# Patient Record
Sex: FEMALE | Race: WHITE | Employment: FULL TIME | URBAN - METROPOLITAN AREA
[De-identification: names, ages, dates, MRNs, and addresses within clinical notes are randomized per-mention and may not be internally consistent; named-entity substitution may affect disease eponyms.]

---

## 2017-01-12 ENCOUNTER — TELEPHONE (OUTPATIENT)
Dept: FAMILY MEDICINE CLINIC | Age: 52
End: 2017-01-12

## 2017-01-12 RX ORDER — LOSARTAN POTASSIUM 50 MG/1
50 TABLET ORAL DAILY
Qty: 90 TABLET | Refills: 0 | Status: SHIPPED | OUTPATIENT
Start: 2017-01-12 | End: 2017-03-20 | Stop reason: SDUPTHER

## 2017-03-20 RX ORDER — LOSARTAN POTASSIUM 50 MG/1
50 TABLET ORAL DAILY
Qty: 90 TABLET | Refills: 4 | Status: SHIPPED | OUTPATIENT
Start: 2017-03-20 | End: 2018-04-03 | Stop reason: SDUPTHER

## 2017-04-13 RX ORDER — LOSARTAN POTASSIUM 50 MG/1
50 TABLET ORAL DAILY
Qty: 90 TABLET | Refills: 4 | OUTPATIENT
Start: 2017-04-13

## 2017-11-09 ENCOUNTER — OFFICE VISIT (OUTPATIENT)
Dept: FAMILY MEDICINE CLINIC | Age: 52
End: 2017-11-09

## 2017-11-09 VITALS
RESPIRATION RATE: 16 BRPM | BODY MASS INDEX: 24.99 KG/M2 | OXYGEN SATURATION: 98 % | DIASTOLIC BLOOD PRESSURE: 70 MMHG | SYSTOLIC BLOOD PRESSURE: 132 MMHG | TEMPERATURE: 98 F | HEIGHT: 65 IN | HEART RATE: 78 BPM | WEIGHT: 150 LBS

## 2017-11-09 DIAGNOSIS — R31.9 HEMATURIA, UNSPECIFIED TYPE: ICD-10-CM

## 2017-11-09 DIAGNOSIS — Z12.11 COLON CANCER SCREENING: ICD-10-CM

## 2017-11-09 DIAGNOSIS — F43.20 ADJUSTMENT DISORDER, UNSPECIFIED TYPE: ICD-10-CM

## 2017-11-09 DIAGNOSIS — N20.0 NEPHROLITHIASIS: ICD-10-CM

## 2017-11-09 DIAGNOSIS — L20.84 INTRINSIC ECZEMA: ICD-10-CM

## 2017-11-09 DIAGNOSIS — I10 ESSENTIAL HYPERTENSION: Primary | ICD-10-CM

## 2017-11-09 PROCEDURE — G8427 DOCREV CUR MEDS BY ELIG CLIN: HCPCS | Performed by: FAMILY MEDICINE

## 2017-11-09 PROCEDURE — G8420 CALC BMI NORM PARAMETERS: HCPCS | Performed by: FAMILY MEDICINE

## 2017-11-09 PROCEDURE — G8484 FLU IMMUNIZE NO ADMIN: HCPCS | Performed by: FAMILY MEDICINE

## 2017-11-09 PROCEDURE — 3017F COLORECTAL CA SCREEN DOC REV: CPT | Performed by: FAMILY MEDICINE

## 2017-11-09 PROCEDURE — 99214 OFFICE O/P EST MOD 30 MIN: CPT | Performed by: FAMILY MEDICINE

## 2017-11-09 PROCEDURE — 90688 IIV4 VACCINE SPLT 0.5 ML IM: CPT | Performed by: FAMILY MEDICINE

## 2017-11-09 PROCEDURE — 3014F SCREEN MAMMO DOC REV: CPT | Performed by: FAMILY MEDICINE

## 2017-11-09 PROCEDURE — 90471 IMMUNIZATION ADMIN: CPT | Performed by: FAMILY MEDICINE

## 2017-11-09 PROCEDURE — 1036F TOBACCO NON-USER: CPT | Performed by: FAMILY MEDICINE

## 2017-11-09 ASSESSMENT — PATIENT HEALTH QUESTIONNAIRE - PHQ9
2. FEELING DOWN, DEPRESSED OR HOPELESS: 0
1. LITTLE INTEREST OR PLEASURE IN DOING THINGS: 0
SUM OF ALL RESPONSES TO PHQ9 QUESTIONS 1 & 2: 0
SUM OF ALL RESPONSES TO PHQ QUESTIONS 1-9: 0

## 2017-11-13 NOTE — PROGRESS NOTES
Chief Complaint   Patient presents with    Check-Up    Discuss Labs   bp under good control  No cp/sob    No further symptoms of nephrolithiasis  saw urology  Has renal stones    Obese condition stable  Needs weight loss    lbp under reasonable control  No radiation  Declines more eval    Needs colon cancer screening  Recommend colonoscopy and GI evaluation    Patient requests influenza vaccination    Patient presents for exam.      Patient Active Problem List   Diagnosis    Nephrolithiasis    Hematuria, Larchian    Adjustment disorder    Essential hypertension    Intrinsic eczema       has a current medication list which includes the following prescription(s): losartan, calcipotriene, aspirin, and multiple vitamins-minerals. Past Medical History:   Diagnosis Date    Hematuria, Larchian 8/21/2014    Nephrolithiasis 8/21/2014    UTI (urinary tract infection) 8/21/2014       Past Surgical History:   Procedure Laterality Date    LAPAROSCOPY  2002       [unfilled]    family history includes Cancer in her father; High Blood Pressure in her father and mother. Social History     Social History    Marital status:      Spouse name: N/A    Number of children: N/A    Years of education: N/A     Occupational History    Not on file.      Social History Main Topics    Smoking status: Former Smoker    Smokeless tobacco: Never Used    Alcohol use Not on file    Drug use: Unknown    Sexual activity: Not on file     Other Topics Concern    Not on file     Social History Narrative    No narrative on file       Allergies   Allergen Reactions    Ace Inhibitors Other (See Comments)     Lisinopril caused dry cough       Review of Systems - General ROS: negative  Psychological ROS: negative  ENT ROS: negative  Hematological and Lymphatic ROS: negative  Endocrine ROS: negative  Respiratory ROS: no cough, shortness of breath, or wheezing  Cardiovascular ROS: no chest pain or dyspnea on exertion  Gastrointestinal ROS: no abdominal pain, change in bowel habits, or black or bloody stools  Genito-Urinary ROS: no dysuria, trouble voiding, or hematuria  Musculoskeletal ROS: negative  Neurological ROS: no TIA or stroke symptoms  Dermatological ROS: negative    Blood pressure 132/70, pulse 78, temperature 98 °F (36.7 °C), temperature source Temporal, resp. rate 16, height 5' 5\" (1.651 m), weight 150 lb (68 kg), SpO2 98 %. Physical Examination: General appearance - alert, well appearing, and in no distress  Mental status - alert, oriented to person, place, and time  Eyes - pupils equal and reactive, extraocular eye movements intact  Ears - bilateral TM's and external ear canals normal  Mouth - mucous membranes moist, pharynx normal without lesions  Neck - supple, no significant adenopathy  Lymphatics - no palpable lymphadenopathy, no hepatosplenomegaly  Chest - clear to auscultation, no wheezes, rales or rhonchi, symmetric air entry  Heart - normal rate, regular rhythm, normal S1, S2, no murmurs, rubs, clicks or gallops  Abdomen - soft, nontender, nondistended, no masses or organomegaly, obese  Neurological - alert, oriented, normal speech, no focal findings or movement disorder noted  Musculoskeletal - no joint tenderness, deformity or swelling  Skin - no rash    Assessment:    1. Essential hypertension     2. Adjustment disorder, unspecified type     3. Hematuria, unspecified type     4. Nephrolithiasis     5. Colon cancer screening  Amb External Referral To Gastroenterology   6.  Intrinsic eczema         Plan:          Orders Placed This Encounter   Procedures    INFLUENZA, QUADV, 3 YRS AND OLDER, IM, MDV, 0.5ML (FLUZONE QUADV)    Amb External Referral To Gastroenterology     Referral Priority:   Routine     Referral Type:   Consult for Advice and Opinion     Referral Reason:   Specialty Services Required     Referred to Provider:   Waqar Hammer MD     Requested Specialty:   Gastroenterology

## 2018-04-03 RX ORDER — LOSARTAN POTASSIUM 50 MG/1
TABLET ORAL
Qty: 90 TABLET | Refills: 1 | Status: SHIPPED | OUTPATIENT
Start: 2018-04-03 | End: 2018-04-10 | Stop reason: SDUPTHER

## 2018-04-10 ENCOUNTER — OFFICE VISIT (OUTPATIENT)
Dept: FAMILY MEDICINE CLINIC | Age: 53
End: 2018-04-10
Payer: COMMERCIAL

## 2018-04-10 VITALS
DIASTOLIC BLOOD PRESSURE: 90 MMHG | WEIGHT: 158 LBS | TEMPERATURE: 99 F | BODY MASS INDEX: 26.33 KG/M2 | SYSTOLIC BLOOD PRESSURE: 128 MMHG | HEIGHT: 65 IN | HEART RATE: 76 BPM | RESPIRATION RATE: 16 BRPM

## 2018-04-10 DIAGNOSIS — L20.84 INTRINSIC ECZEMA: ICD-10-CM

## 2018-04-10 DIAGNOSIS — R31.9 HEMATURIA, UNSPECIFIED TYPE: ICD-10-CM

## 2018-04-10 DIAGNOSIS — Z12.11 COLON CANCER SCREENING: ICD-10-CM

## 2018-04-10 DIAGNOSIS — I10 ESSENTIAL HYPERTENSION: Primary | ICD-10-CM

## 2018-04-10 DIAGNOSIS — F43.20 ADJUSTMENT DISORDER, UNSPECIFIED TYPE: ICD-10-CM

## 2018-04-10 DIAGNOSIS — L40.9 PSORIASIS: ICD-10-CM

## 2018-04-10 PROCEDURE — 3017F COLORECTAL CA SCREEN DOC REV: CPT | Performed by: FAMILY MEDICINE

## 2018-04-10 PROCEDURE — 1036F TOBACCO NON-USER: CPT | Performed by: FAMILY MEDICINE

## 2018-04-10 PROCEDURE — 3014F SCREEN MAMMO DOC REV: CPT | Performed by: FAMILY MEDICINE

## 2018-04-10 PROCEDURE — 99214 OFFICE O/P EST MOD 30 MIN: CPT | Performed by: FAMILY MEDICINE

## 2018-04-10 PROCEDURE — G8427 DOCREV CUR MEDS BY ELIG CLIN: HCPCS | Performed by: FAMILY MEDICINE

## 2018-04-10 PROCEDURE — G8419 CALC BMI OUT NRM PARAM NOF/U: HCPCS | Performed by: FAMILY MEDICINE

## 2018-04-10 RX ORDER — LOSARTAN POTASSIUM 100 MG/1
TABLET ORAL
Qty: 30 TABLET | Refills: 1 | Status: SHIPPED | OUTPATIENT
Start: 2018-04-10 | End: 2018-07-17 | Stop reason: SDUPTHER

## 2018-06-07 RX ORDER — SODIUM CHLORIDE 0.9 % (FLUSH) 0.9 %
10 SYRINGE (ML) INJECTION PRN
Status: CANCELLED | OUTPATIENT
Start: 2018-06-07

## 2018-06-10 ENCOUNTER — ANESTHESIA EVENT (OUTPATIENT)
Dept: ENDOSCOPY | Age: 53
End: 2018-06-10
Payer: COMMERCIAL

## 2018-06-11 ENCOUNTER — HOSPITAL ENCOUNTER (OUTPATIENT)
Age: 53
Setting detail: OUTPATIENT SURGERY
Discharge: HOME | End: 2018-06-11
Attending: SPECIALIST | Admitting: SPECIALIST
Payer: COMMERCIAL

## 2018-06-11 ENCOUNTER — ANESTHESIA (OUTPATIENT)
Dept: ENDOSCOPY | Age: 53
End: 2018-06-11
Payer: COMMERCIAL

## 2018-06-11 VITALS
RESPIRATION RATE: 16 BRPM | DIASTOLIC BLOOD PRESSURE: 83 MMHG | TEMPERATURE: 98 F | HEIGHT: 65 IN | HEART RATE: 67 BPM | BODY MASS INDEX: 25.83 KG/M2 | OXYGEN SATURATION: 99 % | WEIGHT: 155 LBS | SYSTOLIC BLOOD PRESSURE: 121 MMHG

## 2018-06-11 VITALS
DIASTOLIC BLOOD PRESSURE: 59 MMHG | TEMPERATURE: 97.7 F | SYSTOLIC BLOOD PRESSURE: 93 MMHG | RESPIRATION RATE: 57 BRPM | OXYGEN SATURATION: 96 %

## 2018-06-11 PROCEDURE — 7100000010 HC PHASE II RECOVERY - FIRST 15 MIN: Performed by: SPECIALIST

## 2018-06-11 PROCEDURE — 6360000002 HC RX W HCPCS: Performed by: NURSE ANESTHETIST, CERTIFIED REGISTERED

## 2018-06-11 PROCEDURE — 3700000000 HC ANESTHESIA ATTENDED CARE: Performed by: SPECIALIST

## 2018-06-11 PROCEDURE — 7100000011 HC PHASE II RECOVERY - ADDTL 15 MIN: Performed by: SPECIALIST

## 2018-06-11 PROCEDURE — 2580000003 HC RX 258: Performed by: SPECIALIST

## 2018-06-11 PROCEDURE — 2500000003 HC RX 250 WO HCPCS: Performed by: NURSE ANESTHETIST, CERTIFIED REGISTERED

## 2018-06-11 PROCEDURE — 3700000001 HC ADD 15 MINUTES (ANESTHESIA): Performed by: SPECIALIST

## 2018-06-11 PROCEDURE — 3609027000 HC COLONOSCOPY: Performed by: SPECIALIST

## 2018-06-11 RX ORDER — SODIUM CHLORIDE 9 MG/ML
INJECTION, SOLUTION INTRAVENOUS CONTINUOUS
Status: CANCELLED | OUTPATIENT
Start: 2018-06-11

## 2018-06-11 RX ORDER — LIDOCAINE HYDROCHLORIDE 10 MG/ML
1 INJECTION, SOLUTION EPIDURAL; INFILTRATION; INTRACAUDAL; PERINEURAL
Status: CANCELLED | OUTPATIENT
Start: 2018-06-11 | End: 2018-06-11

## 2018-06-11 RX ORDER — ONDANSETRON 2 MG/ML
4 INJECTION INTRAMUSCULAR; INTRAVENOUS
Status: DISCONTINUED | OUTPATIENT
Start: 2018-06-11 | End: 2018-06-11 | Stop reason: HOSPADM

## 2018-06-11 RX ORDER — PROPOFOL 10 MG/ML
INJECTION, EMULSION INTRAVENOUS PRN
Status: DISCONTINUED | OUTPATIENT
Start: 2018-06-11 | End: 2018-06-11 | Stop reason: SDUPTHER

## 2018-06-11 RX ORDER — LIDOCAINE HYDROCHLORIDE 10 MG/ML
1 INJECTION, SOLUTION EPIDURAL; INFILTRATION; INTRACAUDAL; PERINEURAL
Status: DISCONTINUED | OUTPATIENT
Start: 2018-06-11 | End: 2018-06-11 | Stop reason: HOSPADM

## 2018-06-11 RX ORDER — LIDOCAINE HYDROCHLORIDE 10 MG/ML
INJECTION, SOLUTION INFILTRATION; PERINEURAL PRN
Status: DISCONTINUED | OUTPATIENT
Start: 2018-06-11 | End: 2018-06-11 | Stop reason: SDUPTHER

## 2018-06-11 RX ORDER — SODIUM CHLORIDE 9 MG/ML
INJECTION, SOLUTION INTRAVENOUS CONTINUOUS
Status: DISCONTINUED | OUTPATIENT
Start: 2018-06-11 | End: 2018-06-11 | Stop reason: HOSPADM

## 2018-06-11 RX ORDER — SODIUM CHLORIDE 0.9 % (FLUSH) 0.9 %
10 SYRINGE (ML) INJECTION PRN
Status: DISCONTINUED | OUTPATIENT
Start: 2018-06-11 | End: 2018-06-11 | Stop reason: HOSPADM

## 2018-06-11 RX ORDER — SODIUM CHLORIDE 0.9 % (FLUSH) 0.9 %
10 SYRINGE (ML) INJECTION EVERY 12 HOURS SCHEDULED
Status: DISCONTINUED | OUTPATIENT
Start: 2018-06-11 | End: 2018-06-11 | Stop reason: SDUPTHER

## 2018-06-11 RX ORDER — SODIUM CHLORIDE 0.9 % (FLUSH) 0.9 %
10 SYRINGE (ML) INJECTION EVERY 12 HOURS SCHEDULED
Status: DISCONTINUED | OUTPATIENT
Start: 2018-06-11 | End: 2018-06-11 | Stop reason: HOSPADM

## 2018-06-11 RX ADMIN — PROPOFOL 100 MG: 10 INJECTION, EMULSION INTRAVENOUS at 07:10

## 2018-06-11 RX ADMIN — LIDOCAINE HYDROCHLORIDE 30 MG: 10 INJECTION, SOLUTION INFILTRATION; PERINEURAL at 07:10

## 2018-06-11 RX ADMIN — PROPOFOL 100 MG: 10 INJECTION, EMULSION INTRAVENOUS at 07:20

## 2018-06-11 RX ADMIN — PROPOFOL 100 MG: 10 INJECTION, EMULSION INTRAVENOUS at 07:25

## 2018-06-11 RX ADMIN — PROPOFOL 50 MG: 10 INJECTION, EMULSION INTRAVENOUS at 07:30

## 2018-06-11 RX ADMIN — PROPOFOL 100 MG: 10 INJECTION, EMULSION INTRAVENOUS at 07:15

## 2018-06-11 RX ADMIN — Medication 10 ML: at 07:32

## 2018-07-17 ENCOUNTER — OFFICE VISIT (OUTPATIENT)
Dept: FAMILY MEDICINE CLINIC | Age: 53
End: 2018-07-17
Payer: COMMERCIAL

## 2018-07-17 VITALS
SYSTOLIC BLOOD PRESSURE: 122 MMHG | HEART RATE: 72 BPM | RESPIRATION RATE: 12 BRPM | HEIGHT: 65 IN | WEIGHT: 155 LBS | BODY MASS INDEX: 25.83 KG/M2 | TEMPERATURE: 98 F | DIASTOLIC BLOOD PRESSURE: 78 MMHG | OXYGEN SATURATION: 96 %

## 2018-07-17 DIAGNOSIS — L40.9 PSORIASIS: ICD-10-CM

## 2018-07-17 DIAGNOSIS — L20.84 INTRINSIC ECZEMA: ICD-10-CM

## 2018-07-17 DIAGNOSIS — F43.20 ADJUSTMENT DISORDER, UNSPECIFIED TYPE: ICD-10-CM

## 2018-07-17 DIAGNOSIS — R31.9 HEMATURIA, UNSPECIFIED TYPE: ICD-10-CM

## 2018-07-17 DIAGNOSIS — I10 ESSENTIAL HYPERTENSION: Primary | ICD-10-CM

## 2018-07-17 DIAGNOSIS — N20.0 NEPHROLITHIASIS: ICD-10-CM

## 2018-07-17 PROCEDURE — G8427 DOCREV CUR MEDS BY ELIG CLIN: HCPCS | Performed by: FAMILY MEDICINE

## 2018-07-17 PROCEDURE — 1036F TOBACCO NON-USER: CPT | Performed by: FAMILY MEDICINE

## 2018-07-17 PROCEDURE — G8419 CALC BMI OUT NRM PARAM NOF/U: HCPCS | Performed by: FAMILY MEDICINE

## 2018-07-17 PROCEDURE — 3014F SCREEN MAMMO DOC REV: CPT | Performed by: FAMILY MEDICINE

## 2018-07-17 PROCEDURE — 3017F COLORECTAL CA SCREEN DOC REV: CPT | Performed by: FAMILY MEDICINE

## 2018-07-17 PROCEDURE — 99214 OFFICE O/P EST MOD 30 MIN: CPT | Performed by: FAMILY MEDICINE

## 2018-07-17 RX ORDER — LOSARTAN POTASSIUM 100 MG/1
TABLET ORAL
Qty: 90 TABLET | Refills: 3 | Status: SHIPPED | OUTPATIENT
Start: 2018-07-17

## 2018-07-17 NOTE — PROGRESS NOTES
Review of Systems - General ROS: negative  Psychological ROS: negative  ENT ROS: negative  Hematological and Lymphatic ROS: negative  Endocrine ROS: negative  Respiratory ROS: no cough, shortness of breath, or wheezing  Cardiovascular ROS: no chest pain or dyspnea on exertion  Gastrointestinal ROS: no abdominal pain, change in bowel habits, or black or bloody stools  Genito-Urinary ROS: no dysuria, trouble voiding, or hematuria  Musculoskeletal ROS: negative  Neurological ROS: no TIA or stroke symptoms  Dermatological ROS: rash    Blood pressure 122/78, pulse 72, temperature 98 °F (36.7 °C), temperature source Temporal, resp. rate 12, height 5' 5\" (1.651 m), weight 155 lb (70.3 kg), SpO2 96 %. Physical Examination: General appearance - alert, well appearing, and in no distress  Mental status - alert, oriented to person, place, and time  Eyes - pupils equal and reactive, extraocular eye movements intact  Ears - bilateral TM's and external ear canals normal  Mouth - mucous membranes moist, pharynx normal without lesions  Neck - supple, no significant adenopathy  Lymphatics - no palpable lymphadenopathy, no hepatosplenomegaly  Chest - clear to auscultation, no wheezes, rales or rhonchi, symmetric air entry  Heart - normal rate, regular rhythm, normal S1, S2, no murmurs, rubs, clicks or gallops  Abdomen - soft, nontender, nondistended, no masses or organomegaly, obese  Neurological - alert, oriented, normal speech, no focal findings or movement disorder noted  Musculoskeletal - no joint tenderness, deformity or swelling  Skin - psoriatic rash at left ankle, o/w no rash    Assessment:     Diagnosis Orders   1. Essential hypertension     2. Adjustment disorder, unspecified type     3. Nephrolithiasis     4. Intrinsic eczema     5. Psoriasis     6. Hematuria, unspecified type         Plan:          No orders of the defined types were placed in this encounter.       Outpatient Encounter Prescriptions as of 7/17/2018 Medication Sig Dispense Refill    losartan (COZAAR) 100 MG tablet TAKE 1 TABLET DAILY (REPLACES LISINOPRIL) 90 tablet 3    Omega-3 Fatty Acids (FISH OIL OMEGA-3 PO) Take by mouth      BIOTIN PO Take by mouth      triamcinolone (KENALOG) 0.1 % ointment Apply topically 2 times daily Apply topically 2 times daily. 30 g 3    calcipotriene (DOVONEX) 0.005 % ointment Apply topically 2 times daily. 120 g 1    aspirin 81 MG tablet Take 81 mg by mouth daily.  Multiple Vitamins-Minerals (CENTRUM PO) Take 1 tablet by mouth daily.  [DISCONTINUED] losartan (COZAAR) 100 MG tablet TAKE 1 TABLET DAILY (REPLACES LISINOPRIL) 30 tablet 1     No facility-administered encounter medications on file as of 7/17/2018. derm eval with continued sx    gi eval for colon check    Recommend lifestyle modification. Report red flag sx  Return in about 6 months (around 1/17/2019). Patient education provided. They understand and agree with this course of treatment. They will return with new or worsening symptoms. Patient instructed to remain current with appropriate annual health maintenance.

## 2018-07-31 RX ORDER — LOSARTAN POTASSIUM 100 MG/1
TABLET ORAL
Qty: 30 TABLET | Refills: 1 | OUTPATIENT
Start: 2018-07-31

## 2018-09-24 ENCOUNTER — OFFICE VISIT (OUTPATIENT)
Dept: FAMILY MEDICINE CLINIC | Age: 53
End: 2018-09-24
Payer: COMMERCIAL

## 2018-09-24 VITALS
HEIGHT: 65 IN | HEART RATE: 72 BPM | WEIGHT: 156 LBS | BODY MASS INDEX: 25.99 KG/M2 | DIASTOLIC BLOOD PRESSURE: 78 MMHG | RESPIRATION RATE: 16 BRPM | TEMPERATURE: 97.3 F | SYSTOLIC BLOOD PRESSURE: 120 MMHG

## 2018-09-24 DIAGNOSIS — Z01.818 PREOP GENERAL PHYSICAL EXAM: Primary | ICD-10-CM

## 2018-09-24 DIAGNOSIS — R31.9 HEMATURIA, UNSPECIFIED TYPE: ICD-10-CM

## 2018-09-24 DIAGNOSIS — N20.0 NEPHROLITHIASIS: ICD-10-CM

## 2018-09-24 PROCEDURE — 3017F COLORECTAL CA SCREEN DOC REV: CPT | Performed by: FAMILY MEDICINE

## 2018-09-24 PROCEDURE — 90471 IMMUNIZATION ADMIN: CPT | Performed by: FAMILY MEDICINE

## 2018-09-24 PROCEDURE — 3014F SCREEN MAMMO DOC REV: CPT | Performed by: FAMILY MEDICINE

## 2018-09-24 PROCEDURE — 90688 IIV4 VACCINE SPLT 0.5 ML IM: CPT | Performed by: FAMILY MEDICINE

## 2018-09-24 PROCEDURE — 99214 OFFICE O/P EST MOD 30 MIN: CPT | Performed by: FAMILY MEDICINE

## 2018-09-24 PROCEDURE — 1036F TOBACCO NON-USER: CPT | Performed by: FAMILY MEDICINE

## 2018-09-24 PROCEDURE — G8419 CALC BMI OUT NRM PARAM NOF/U: HCPCS | Performed by: FAMILY MEDICINE

## 2018-09-24 PROCEDURE — 93000 ELECTROCARDIOGRAM COMPLETE: CPT | Performed by: FAMILY MEDICINE

## 2018-09-24 PROCEDURE — G8427 DOCREV CUR MEDS BY ELIG CLIN: HCPCS | Performed by: FAMILY MEDICINE

## 2018-09-24 ASSESSMENT — PATIENT HEALTH QUESTIONNAIRE - PHQ9
SUM OF ALL RESPONSES TO PHQ9 QUESTIONS 1 & 2: 0
2. FEELING DOWN, DEPRESSED OR HOPELESS: 0
SUM OF ALL RESPONSES TO PHQ QUESTIONS 1-9: 0
SUM OF ALL RESPONSES TO PHQ QUESTIONS 1-9: 0
1. LITTLE INTEREST OR PLEASURE IN DOING THINGS: 0

## 2018-09-24 NOTE — PROGRESS NOTES
Chief Complaint   Patient presents with    Other     surgery clearence for 10/11/18       Patient was referred to me for a preoperative evaluation prior to renal procedure with Dr. George Alamo. The surgery is scheduled for 10/11/18. Reactions to anesthesia: none    History of excessive bleeding: none    History of blood clots: none    History of blood transfusions: none      Reactions to blood transfusion: none     Past Medical History:   Diagnosis Date    Hematuria, Larchian 8/21/2014    Nephrolithiasis 8/21/2014    UTI (urinary tract infection) 8/21/2014       Past Surgical History:   Procedure Laterality Date    LAPAROSCOPY  2002    CT COLON CA SCRN NOT  [address] IND N/A 6/11/2018    COLONOSCOPY performed by Abby Grimes MD at [address] [place] [address]       Current Outpatient Prescriptions   Medication Sig Dispense Refill    losartan (COZAAR) 100 MG tablet TAKE 1 TABLET DAILY (REPLACES LISINOPRIL) 90 tablet 3    Omega-3 Fatty Acids (FISH OIL OMEGA-3 PO) Take by mouth      BIOTIN PO Take by mouth      triamcinolone (KENALOG) 0.1 % ointment Apply topically 2 times daily Apply topically 2 times daily. 30 g 3    calcipotriene (DOVONEX) 0.005 % ointment Apply topically 2 times daily. 120 g 1    aspirin 81 MG tablet Take 81 mg by mouth daily.  Multiple Vitamins-Minerals (CENTRUM PO) Take 1 tablet by mouth daily. No current facility-administered medications for this visit. Allergies   Allergen Reactions    Ace Inhibitors Other (See Comments)     Lisinopril caused dry cough       Social History     Social History    Marital status:      Spouse name: N/A    Number of children: N/A    Years of education: N/A     Occupational History    Not on file.      Social History Main Topics    Smoking status: Former Smoker    Smokeless tobacco: Never Used    Alcohol use Not on file      Comment: occasional    Drug use: No    Sexual activity: Not on file     Other Topics Concern    Not

## 2018-09-27 ENCOUNTER — TELEPHONE (OUTPATIENT)
Dept: FAMILY MEDICINE CLINIC | Age: 53
End: 2018-09-27

## 2019-04-12 ENCOUNTER — TELEPHONE (OUTPATIENT)
Dept: FAMILY MEDICINE CLINIC | Age: 54
End: 2019-04-12

## 2023-05-18 ENCOUNTER — OFFICE VISIT (OUTPATIENT)
Dept: PRIMARY CARE | Facility: CLINIC | Age: 58
End: 2023-05-18
Payer: COMMERCIAL

## 2023-05-18 VITALS
SYSTOLIC BLOOD PRESSURE: 139 MMHG | HEART RATE: 74 BPM | DIASTOLIC BLOOD PRESSURE: 95 MMHG | WEIGHT: 168 LBS | TEMPERATURE: 97.3 F | BODY MASS INDEX: 27.96 KG/M2 | RESPIRATION RATE: 16 BRPM | OXYGEN SATURATION: 99 %

## 2023-05-18 DIAGNOSIS — M77.01 MEDIAL EPICONDYLITIS OF ELBOW, RIGHT: Primary | ICD-10-CM

## 2023-05-18 PROBLEM — H57.89 REDNESS OF EYE, RIGHT: Status: ACTIVE | Noted: 2023-05-18

## 2023-05-18 PROBLEM — H57.11 DISCOMFORT OF RIGHT EYE: Status: ACTIVE | Noted: 2023-05-18

## 2023-05-18 PROBLEM — R35.1 NOCTURIA: Status: ACTIVE | Noted: 2023-05-18

## 2023-05-18 PROBLEM — M25.519 SHOULDER PAIN: Status: ACTIVE | Noted: 2023-05-18

## 2023-05-18 PROBLEM — B30.9 ACUTE VIRAL CONJUNCTIVITIS OF RIGHT EYE: Status: ACTIVE | Noted: 2023-05-18

## 2023-05-18 PROBLEM — M54.16 LUMBAR RADICULOPATHY: Status: ACTIVE | Noted: 2023-05-18

## 2023-05-18 PROBLEM — H10.31 ACUTE CONJUNCTIVITIS OF RIGHT EYE: Status: RESOLVED | Noted: 2023-05-18 | Resolved: 2023-05-18

## 2023-05-18 PROBLEM — H43.392 VITREOUS FLOATERS OF LEFT EYE: Status: ACTIVE | Noted: 2023-05-18

## 2023-05-18 PROBLEM — G62.9 NEUROPATHY: Status: ACTIVE | Noted: 2023-05-18

## 2023-05-18 PROBLEM — N35.12 POSTINFECTIVE URETHRAL STRICTURE IN FEMALE: Status: ACTIVE | Noted: 2023-05-18

## 2023-05-18 PROBLEM — L20.84 INTRINSIC ECZEMA: Status: ACTIVE | Noted: 2017-11-09

## 2023-05-18 PROBLEM — N20.1 URETERAL CALCULUS: Status: ACTIVE | Noted: 2023-05-18

## 2023-05-18 PROBLEM — N39.0 UTI (URINARY TRACT INFECTION): Status: RESOLVED | Noted: 2023-05-18 | Resolved: 2023-05-18

## 2023-05-18 PROBLEM — R51.9 HEADACHE: Status: ACTIVE | Noted: 2023-05-18

## 2023-05-18 PROBLEM — J34.89 SINUS PRESSURE: Status: RESOLVED | Noted: 2023-05-18 | Resolved: 2023-05-18

## 2023-05-18 PROBLEM — N20.9 URINARY CALCULUS, UNSPECIFIED: Status: ACTIVE | Noted: 2023-05-18

## 2023-05-18 PROBLEM — M75.81 TENDINITIS OF RIGHT ROTATOR CUFF: Status: ACTIVE | Noted: 2023-05-18

## 2023-05-18 PROBLEM — R05.9 COUGH: Status: RESOLVED | Noted: 2023-05-18 | Resolved: 2023-05-18

## 2023-05-18 PROBLEM — L40.9 PSORIASIS: Status: ACTIVE | Noted: 2018-04-10

## 2023-05-18 PROBLEM — B34.9 VIRAL INFECTION: Status: RESOLVED | Noted: 2023-05-18 | Resolved: 2023-05-18

## 2023-05-18 PROBLEM — R09.81 SINUS CONGESTION: Status: RESOLVED | Noted: 2023-05-18 | Resolved: 2023-05-18

## 2023-05-18 PROBLEM — J01.10 ACUTE FRONTAL SINUSITIS: Status: RESOLVED | Noted: 2023-05-18 | Resolved: 2023-05-18

## 2023-05-18 PROCEDURE — 99212 OFFICE O/P EST SF 10 MIN: CPT | Performed by: NURSE PRACTITIONER

## 2023-05-18 PROCEDURE — 3075F SYST BP GE 130 - 139MM HG: CPT | Performed by: NURSE PRACTITIONER

## 2023-05-18 PROCEDURE — 3080F DIAST BP >= 90 MM HG: CPT | Performed by: NURSE PRACTITIONER

## 2023-05-18 PROCEDURE — 1036F TOBACCO NON-USER: CPT | Performed by: NURSE PRACTITIONER

## 2023-05-18 RX ORDER — BIOTIN 5 MG
1 TABLET ORAL DAILY
COMMUNITY
Start: 2014-08-06

## 2023-05-18 RX ORDER — ASPIRIN 81 MG/1
1 TABLET ORAL DAILY
COMMUNITY
Start: 2010-01-11

## 2023-05-18 RX ORDER — LOSARTAN POTASSIUM 100 MG/1
100 TABLET ORAL DAILY
COMMUNITY
End: 2023-09-05

## 2023-05-18 ASSESSMENT — ENCOUNTER SYMPTOMS
FATIGUE: 0
ABDOMINAL PAIN: 0
EYE PAIN: 0
PAIN: 1
EYES NEGATIVE: 1
CARDIOVASCULAR NEGATIVE: 1
STIFFNESS: 0
SENSORY CHANGE: 0
JOINT SWELLING: 0
NAUSEA: 0
ENDOCRINE NEGATIVE: 1
RESPIRATORY NEGATIVE: 1
SHORTNESS OF BREATH: 0
FEVER: 0
VOMITING: 0
WHEEZING: 0
SWOLLEN GLANDS: 0
NEUROLOGICAL NEGATIVE: 1
WEAKNESS: 0
PSYCHIATRIC NEGATIVE: 1
DIARRHEA: 0
ALLERGIC/IMMUNOLOGIC NEGATIVE: 1
GASTROINTESTINAL NEGATIVE: 1
CONSTITUTIONAL NEGATIVE: 1
DYSURIA: 0
HEADACHES: 0
CONSTIPATION: 0
HEMATOLOGIC/LYMPHATIC NEGATIVE: 1
VISUAL CHANGE: 0

## 2023-05-18 NOTE — PROGRESS NOTES
Patient's PCP is Nader Colbert MD      Symptoms:  RIGHT  ELBOW red and swollen   Pain Scale: 4  out of 10 uncomfortable   Length of Symptoms: This morning 05.18.2023    Denies: Fall, injury, Trauma,   Factors that effect symptoms:    --Related Information--

## 2023-05-18 NOTE — PROGRESS NOTES
Subjective   Patient ID: Jade Schwab is a 57 y.o. female who presents for Pain.  Patient presents to convenient care appointment with complaint of right elbow pain began today. Patient has not taken any OTC for relief.  Patient denies injury, fever, discharge, wound, nor swelling.  Patient reports pain when resting elbow on desk.     Pain  This is a new problem. The current episode started today. The problem occurs intermittently. The problem is unchanged. The context of the pain is unknown. The pain is present in the right elbow. Pertinent negatives include no abdominal pain, chest pain, constipation, diarrhea, dysuria, eye pain, fatigue, fever, headaches, joint swelling, nausea, rash, stiffness, sensory change, shortness of breath, swollen glands, urinary symptoms, vaginal discharge, visual change, vomiting, weakness or wheezing. Past treatments include nothing. There is no swelling present.       Review of Systems   Constitutional: Negative.  Negative for fatigue and fever.   HENT: Negative.     Eyes: Negative.  Negative for pain.   Respiratory: Negative.  Negative for shortness of breath and wheezing.    Cardiovascular: Negative.  Negative for chest pain.   Gastrointestinal: Negative.  Negative for abdominal pain, constipation, diarrhea, nausea and vomiting.   Endocrine: Negative.    Genitourinary: Negative.  Negative for dysuria and vaginal discharge.   Musculoskeletal:  Negative for joint swelling and stiffness.        Right elbow pain.    Skin: Negative.  Negative for rash.   Allergic/Immunologic: Negative.    Neurological: Negative.  Negative for sensory change, weakness and headaches.   Hematological: Negative.    Psychiatric/Behavioral: Negative.     All other systems reviewed and are negative.      Temp 36.3 °C (97.3 °F)   Resp 16   Wt 77.1 kg (170 lb)   SpO2 99%   BMI 28.29 kg/m²     Objective   Physical Exam  Vitals and nursing note reviewed.   Constitutional:       Appearance: Normal  appearance.   HENT:      Head: Normocephalic and atraumatic.      Mouth/Throat:      Mouth: Mucous membranes are moist.      Pharynx: Oropharynx is clear.   Eyes:      Extraocular Movements: Extraocular movements intact.      Conjunctiva/sclera: Conjunctivae normal.      Pupils: Pupils are equal, round, and reactive to light.   Cardiovascular:      Rate and Rhythm: Normal rate.      Pulses: Normal pulses.      Heart sounds: Normal heart sounds.   Pulmonary:      Effort: Pulmonary effort is normal.      Breath sounds: Normal breath sounds.   Musculoskeletal:         General: Tenderness present. No swelling, deformity or signs of injury. Normal range of motion.      Cervical back: Normal range of motion and neck supple.      Right lower leg: No edema.      Left lower leg: No edema.      Comments: Patient has full active, passive and resistive ROM right elbow.  Patient  strength equal bilaterally.  Patient has tenderness with palpation on medical epicondyle.  No edema, redness, drainage or wound noted.    Skin:     General: Skin is warm and dry.      Capillary Refill: Capillary refill takes less than 2 seconds.   Neurological:      General: No focal deficit present.      Mental Status: She is alert and oriented to person, place, and time.   Psychiatric:         Mood and Affect: Mood normal.         Behavior: Behavior normal.         Thought Content: Thought content normal.         Judgment: Judgment normal.         Assessment/Plan   Problem List Items Addressed This Visit    None

## 2023-05-18 NOTE — PATIENT INSTRUCTIONS
Patient was seen and examined.  Diagnosis, treatment, and possible complications of today's illness discussed and explained to patient.  Patient to take OTC analgesic if needed for pain.  Patient has elbow brace that she has used for left elbow in the past and will use for relief.  PatientPatient educated and advised to come back if worsening or persistent symptoms. Patient educated on when to seek urgent/emergent care. Patient was given opportunity to ask questions and denied any at this time.  Patient verbalized understanding and agrees with plan of care.

## 2023-06-02 LAB
APPEARANCE, URINE: CLEAR
BILIRUBIN, URINE: NEGATIVE
BLOOD, URINE: ABNORMAL
COLOR, URINE: YELLOW
GLUCOSE, URINE: NEGATIVE MG/DL
KETONES, URINE: NEGATIVE MG/DL
LEUKOCYTE ESTERASE, URINE: NEGATIVE
MUCUS, URINE: NORMAL /LPF
NITRITE, URINE: NEGATIVE
PH, URINE: 6 (ref 5–8)
PROTEIN, URINE: NEGATIVE MG/DL
RBC, URINE: 5 /HPF (ref 0–5)
SPECIFIC GRAVITY, URINE: 1.02 (ref 1–1.03)
UROBILINOGEN, URINE: <2 MG/DL (ref 0–1.9)
WBC, URINE: 1 /HPF (ref 0–5)

## 2023-06-03 LAB — URINE CULTURE: NO GROWTH

## 2023-09-03 DIAGNOSIS — I10 ESSENTIAL (PRIMARY) HYPERTENSION: ICD-10-CM

## 2023-09-05 ENCOUNTER — E-VISIT (OUTPATIENT)
Dept: PRIMARY CARE | Facility: CLINIC | Age: 58
End: 2023-09-05
Payer: COMMERCIAL

## 2023-09-05 DIAGNOSIS — I10 ESSENTIAL HYPERTENSION: ICD-10-CM

## 2023-09-05 PROBLEM — L82.1 OTHER SEBORRHEIC KERATOSIS: Status: ACTIVE | Noted: 2023-06-28

## 2023-09-05 PROBLEM — D18.01 HEMANGIOMA OF SKIN AND SUBCUTANEOUS TISSUE: Status: ACTIVE | Noted: 2023-06-28

## 2023-09-05 PROBLEM — D22.5 MELANOCYTIC NEVI OF TRUNK: Status: ACTIVE | Noted: 2023-06-28

## 2023-09-05 PROBLEM — H66.90 OTITIS MEDIA, ACUTE: Status: ACTIVE | Noted: 2023-09-05

## 2023-09-05 PROBLEM — K63.5 COLON POLYP: Status: ACTIVE | Noted: 2023-09-05

## 2023-09-05 PROBLEM — D22.9 MELANOCYTIC NEVUS: Status: ACTIVE | Noted: 2023-06-28

## 2023-09-05 PROBLEM — L29.9 PRURITUS: Status: ACTIVE | Noted: 2023-09-05

## 2023-09-05 PROBLEM — M79.671 RIGHT FOOT PAIN: Status: ACTIVE | Noted: 2023-09-05

## 2023-09-05 PROBLEM — D23.72 OTHER BENIGN NEOPLASM OF SKIN OF LEFT LOWER LIMB, INCLUDING HIP: Status: ACTIVE | Noted: 2023-06-28

## 2023-09-05 PROBLEM — L81.4 OTHER MELANIN HYPERPIGMENTATION: Status: ACTIVE | Noted: 2023-06-28

## 2023-09-05 PROBLEM — L57.8 OTHER SKIN CHANGES DUE TO CHRONIC EXPOSURE TO NONIONIZING RADIATION: Status: ACTIVE | Noted: 2023-06-28

## 2023-09-05 RX ORDER — LOSARTAN POTASSIUM 100 MG/1
100 TABLET ORAL DAILY
Qty: 90 TABLET | Refills: 3 | Status: SHIPPED | OUTPATIENT
Start: 2023-09-05 | End: 2023-09-12 | Stop reason: SDUPTHER

## 2023-09-05 NOTE — TELEPHONE ENCOUNTER
Recent Visits  Date Type Provider Dept   05/18/23 Office Visit Kelly J Slavik-Bosworth, APRN-CNP [facility] [id number]   Showing recent visits within past 540 days and meeting all other requirements  Future Appointments  Date Type Provider Dept   09/12/23 Appointment Nader Colbert MD [facility] [id number] [facility]   Showing future appointments within next 180 days and meeting all other requirements

## 2023-09-07 ENCOUNTER — LAB (OUTPATIENT)
Dept: LAB | Facility: LAB | Age: 58
End: 2023-09-07
Payer: COMMERCIAL

## 2023-09-07 DIAGNOSIS — I10 ESSENTIAL HYPERTENSION: ICD-10-CM

## 2023-09-07 LAB
CHOLESTEROL (MG/DL) IN SER/PLAS: 171 MG/DL (ref 0–199)
CHOLESTEROL IN HDL (MG/DL) IN SER/PLAS: 53.7 MG/DL
CHOLESTEROL/HDL RATIO: 3.2
FASTING GLUCOSE (MG/DL) IN SER/PLAS: 89 MG/DL (ref 74–99)
LDL: 92 MG/DL (ref 0–99)
TRIGLYCERIDE (MG/DL) IN SER/PLAS: 128 MG/DL (ref 0–149)
VLDL: 26 MG/DL (ref 0–40)

## 2023-09-07 PROCEDURE — 82947 ASSAY GLUCOSE BLOOD QUANT: CPT

## 2023-09-07 PROCEDURE — 36415 COLL VENOUS BLD VENIPUNCTURE: CPT

## 2023-09-07 PROCEDURE — 80061 LIPID PANEL: CPT

## 2023-09-12 ENCOUNTER — OFFICE VISIT (OUTPATIENT)
Dept: PRIMARY CARE | Facility: CLINIC | Age: 58
End: 2023-09-12
Payer: COMMERCIAL

## 2023-09-12 VITALS
HEIGHT: 65 IN | DIASTOLIC BLOOD PRESSURE: 78 MMHG | WEIGHT: 168.6 LBS | BODY MASS INDEX: 28.09 KG/M2 | OXYGEN SATURATION: 98 % | SYSTOLIC BLOOD PRESSURE: 112 MMHG | TEMPERATURE: 97.2 F | HEART RATE: 67 BPM

## 2023-09-12 DIAGNOSIS — N20.0 CALCULUS OF KIDNEY: ICD-10-CM

## 2023-09-12 DIAGNOSIS — Z00.00 ANNUAL PHYSICAL EXAM: Primary | ICD-10-CM

## 2023-09-12 DIAGNOSIS — E66.3 OVERWEIGHT: ICD-10-CM

## 2023-09-12 DIAGNOSIS — I10 ESSENTIAL (PRIMARY) HYPERTENSION: ICD-10-CM

## 2023-09-12 DIAGNOSIS — I10 ESSENTIAL HYPERTENSION: ICD-10-CM

## 2023-09-12 PROCEDURE — 1036F TOBACCO NON-USER: CPT | Performed by: FAMILY MEDICINE

## 2023-09-12 PROCEDURE — 3078F DIAST BP <80 MM HG: CPT | Performed by: FAMILY MEDICINE

## 2023-09-12 PROCEDURE — 99214 OFFICE O/P EST MOD 30 MIN: CPT | Performed by: FAMILY MEDICINE

## 2023-09-12 PROCEDURE — 99396 PREV VISIT EST AGE 40-64: CPT | Performed by: FAMILY MEDICINE

## 2023-09-12 PROCEDURE — 3074F SYST BP LT 130 MM HG: CPT | Performed by: FAMILY MEDICINE

## 2023-09-12 RX ORDER — LOSARTAN POTASSIUM 100 MG/1
100 TABLET ORAL DAILY
Qty: 90 TABLET | Refills: 3 | Status: SHIPPED | OUTPATIENT
Start: 2023-09-12

## 2023-09-12 ASSESSMENT — ENCOUNTER SYMPTOMS
HEADACHES: 0
COUGH: 0
ADENOPATHY: 0
BACK PAIN: 0
EYE DISCHARGE: 0
ABDOMINAL PAIN: 0
NECK PAIN: 0
BRUISES/BLEEDS EASILY: 0
FATIGUE: 0
CONSTIPATION: 0
DYSPHORIC MOOD: 0
SHORTNESS OF BREATH: 0
SORE THROAT: 0
DIFFICULTY URINATING: 0
MYALGIAS: 0
BLOOD IN STOOL: 0
WEAKNESS: 0
VOMITING: 0
NUMBNESS: 0
DIARRHEA: 0
HEMATURIA: 0
DIZZINESS: 0
NAUSEA: 0
ARTHRALGIAS: 0
CHEST TIGHTNESS: 0
ACTIVITY CHANGE: 0
NERVOUS/ANXIOUS: 0

## 2023-09-12 NOTE — PROGRESS NOTES
"Subjective   Patient ID: Jade Schwab is a 58 y.o. female who presents for annual physical plus a checkup visit today.      HPI  Patient here for annual physical exam    Also needs general checkup visit for her medical conditions    Hypertension stable no chest pain or shortness of breath  Tolerates medicine    Renal stones stable  No progression or worsening    Overweight  Recommend weight loss  Review of Systems   Constitutional:  Negative for activity change and fatigue.   HENT:  Negative for congestion and sore throat.    Eyes:  Negative for discharge.   Respiratory:  Negative for cough, chest tightness and shortness of breath.    Cardiovascular:  Negative for chest pain and leg swelling.   Gastrointestinal:  Negative for abdominal pain, blood in stool, constipation, diarrhea, nausea and vomiting.   Endocrine: Negative for cold intolerance and heat intolerance.   Genitourinary:  Negative for difficulty urinating and hematuria.   Musculoskeletal:  Negative for arthralgias, back pain, gait problem, myalgias and neck pain.   Allergic/Immunologic: Negative for environmental allergies.   Neurological:  Negative for dizziness, syncope, weakness, numbness and headaches.   Hematological:  Negative for adenopathy. Does not bruise/bleed easily.   Psychiatric/Behavioral:  Negative for dysphoric mood. The patient is not nervous/anxious.    All other systems reviewed and are negative.      Objective   /78 (BP Location: Left arm, BP Cuff Size: Large adult)   Pulse 67   Temp 36.2 °C (97.2 °F)   Ht 1.651 m (5' 5\")   Wt 76.5 kg (168 lb 9.6 oz)   SpO2 98%   BMI 28.06 kg/m²    Physical Exam  Vitals and nursing note reviewed.   Constitutional:       General: She is not in acute distress.     Appearance: Normal appearance.   HENT:      Head: Normocephalic and atraumatic.      Right Ear: Tympanic membrane, ear canal and external ear normal.      Left Ear: Tympanic membrane, ear canal and external ear normal.      " Nose: Nose normal.      Mouth/Throat:      Mouth: Mucous membranes are moist.      Pharynx: Oropharynx is clear. No oropharyngeal exudate or posterior oropharyngeal erythema.   Eyes:      Extraocular Movements: Extraocular movements intact.      Conjunctiva/sclera: Conjunctivae normal.      Pupils: Pupils are equal, round, and reactive to light.   Cardiovascular:      Rate and Rhythm: Normal rate and regular rhythm.      Pulses: Normal pulses.      Heart sounds: Normal heart sounds. No murmur heard.  Pulmonary:      Effort: Pulmonary effort is normal. No respiratory distress.      Breath sounds: Normal breath sounds. No wheezing or rales.   Abdominal:      General: Abdomen is flat. Bowel sounds are normal. There is no distension.      Palpations: Abdomen is soft. There is no mass.      Tenderness: There is no abdominal tenderness.   Musculoskeletal:         General: No swelling or deformity. Normal range of motion.      Cervical back: Normal range of motion and neck supple.      Right lower leg: No edema.      Left lower leg: No edema.   Lymphadenopathy:      Cervical: No cervical adenopathy.   Skin:     General: Skin is warm and dry.      Capillary Refill: Capillary refill takes less than 2 seconds.      Findings: No lesion or rash.   Neurological:      General: No focal deficit present.      Mental Status: She is alert and oriented to person, place, and time.      Cranial Nerves: No cranial nerve deficit.      Motor: No weakness.   Psychiatric:         Mood and Affect: Mood normal.         Behavior: Behavior normal.         Thought Content: Thought content normal.         Judgment: Judgment normal.         Assessment/Plan   Problem List Items Addressed This Visit       Essential hypertension    Calculus of kidney     Other Visit Diagnoses       Annual physical exam    -  Primary    Essential (primary) hypertension        Relevant Medications    losartan (Cozaar) 100 mg tablet    Overweight                Patient  education provided.  Stay current with age appropriate health maintenance as instructed.  Appointment here or ER with new or worsening symptoms'  Keep appropriate follow-up visit.  Stay current with proper immunizations   Weight loss and diet  Refill medicine  Stressed hydration  Stay current with general health maintenance as well  Stay current with mammogram and colonoscopy and female health maintenance  Discussed proper immunizations  Recheck 6 months and as needed

## 2024-02-26 ENCOUNTER — PROCEDURE VISIT (OUTPATIENT)
Dept: UROLOGY | Facility: CLINIC | Age: 59
End: 2024-02-26
Payer: COMMERCIAL

## 2024-02-26 VITALS
BODY MASS INDEX: 28.73 KG/M2 | WEIGHT: 172.62 LBS | HEART RATE: 85 BPM | SYSTOLIC BLOOD PRESSURE: 138 MMHG | DIASTOLIC BLOOD PRESSURE: 82 MMHG | RESPIRATION RATE: 14 BRPM

## 2024-02-26 DIAGNOSIS — R31.9 HEMATURIA, UNSPECIFIED TYPE: ICD-10-CM

## 2024-02-26 DIAGNOSIS — R31.0 GROSS HEMATURIA: ICD-10-CM

## 2024-02-26 DIAGNOSIS — N35.12 POSTINFECTIVE URETHRAL STRICTURE IN FEMALE: Primary | ICD-10-CM

## 2024-02-26 DIAGNOSIS — N39.0 URINARY TRACT INFECTION WITHOUT HEMATURIA, SITE UNSPECIFIED: ICD-10-CM

## 2024-02-26 LAB
POC APPEARANCE, URINE: CLEAR
POC BILIRUBIN, URINE: NEGATIVE
POC BLOOD, URINE: NEGATIVE
POC COLOR, URINE: YELLOW
POC GLUCOSE, URINE: NEGATIVE MG/DL
POC KETONES, URINE: NEGATIVE MG/DL
POC LEUKOCYTES, URINE: NEGATIVE
POC NITRITE,URINE: NEGATIVE
POC PH, URINE: 5.5 PH
POC PROTEIN, URINE: NEGATIVE MG/DL
POC SPECIFIC GRAVITY, URINE: 1.02
POC UROBILINOGEN, URINE: 0.2 EU/DL

## 2024-02-26 PROCEDURE — 51798 US URINE CAPACITY MEASURE: CPT | Performed by: UROLOGY

## 2024-02-26 PROCEDURE — 52281 CYSTOSCOPY AND TREATMENT: CPT | Performed by: UROLOGY

## 2024-02-26 PROCEDURE — 81003 URINALYSIS AUTO W/O SCOPE: CPT | Performed by: UROLOGY

## 2024-02-26 PROCEDURE — 51741 ELECTRO-UROFLOWMETRY FIRST: CPT | Performed by: UROLOGY

## 2024-02-26 NOTE — LETTER
February 26, 2024     Nader Colbert MD  [address] [address]    Patient: Jade Schwab   YOB: 1965   Date of Visit: 2/26/2024       Dear Dr. Nader Colbert MD:    Thank you for referring Jade Schwab to me for evaluation. Below are my notes for this consultation.  If you have questions, please do not hesitate to call me. I look forward to following your patient along with you.       Sincerely,     Oscar Eddy MD      CC: No Recipients  ______________________________________________________________________________________      Provider Impressions     58 year-old white female originally seen on 07/09/14. The patient passed 2 CALCULI, 0.7 and 0.9 cm. There was a question of a left STRICTURE on CAT scan. It recommended an intravenous pyelogram. CALCULI were sent for analysis. Patient is employed as a  at the [employer]. She has a positive family history of prostate cancer. She has a 10-pack-year cigarette smoking history.      CALCIUM OXALATE  stones     As part of her evaluation, a renal colic CAT scan was performed which identified a left URETERAL STRICTURE with poor emptying. Recommendation was for an intravenous pyelogram.      The patient returns today without any complaints. As stated, the stone analysis was CALCIUM OXALATE. The intravenous pyelogram only identified a 0.3 cm in diameter right RENAL CALCULUS. There was no evidence of obstruction or ureteral stricture     07/24/15, patient states that she is passed 2 STONES over the past year, one requiring an emergency room visit. She states that the STONES were bigger than the last time, which was 9 mm. Her most recent CAT scan shows several STONES in the left KIDNEY, the largest being 4 mm. Her urinalysis is negative.     7/22/2016â€“Established pt here today for f/u and and review of testing. Denies flank pain, dysuria, or hematuria.  Renal colic CTâ€“left 's renal CALCULI, at least 5 in  "number. Largest measuring 6 x 4 mm; urinalysis-negative     08/31/16, IVP with an addendum identifies multiple left RENAL CALCULI measuring 3 mm. The patient will return in 1 year.     06/14/17, patient has no new complaints. Urinalysis is negative. Renal colic CAT scan shows 5 STONES in the left kidney with the largest being 4 mm. She has no stones in the right kidney. She will return in 1 year. She was given an oxalate packet today.     07/23/18, patient has no new complaints. Urinalysis and urine culture are negative. Renal colic CAT scan shows 5 STONES. A 6 mm left upper pole STONE, a 2 mm left mid pole STONE and 3-4 mm STONES in the lower pole of the left kidney. She will return with a creatinine and IVP. We will discuss ESWL at that time.     08/20/18, IVP is completed and although there is some bowel gas obscuring, the upper pole large 8 mm STONE in the lower pole 5 mm STONES were seen again. The patient does wish to proceed with shockwave lithotripsy and will be scheduled for 10/11/18. Dr. Colbert will provide preoperative risk stratification.     10/15/18, OR, CYSTOSCOPY WITH URETHRAL DILATATION, LEFT RETROPYELOGRAM, PLACEMENT OF LEFT 6 Egyptian BY 24 CM DOUBLE-J URETERAL STENT, ESWL OF A 0.9, 0.8 AND 0.6 CM RENAL CALCULI.     11/12/18, patient calls complaining that she is \"miserable\" with her stent. She is still has residual stone burden and is scheduled for ESWL on 11/29/18. However, due to her discomfort with the stent, she will be returning for stent removal in office. She understands that she may take the chance of a stone fragment obstructing the ureter. She is well aware of the benefits and risks and wishes to proceed.     11/17/18, in office cystoscopy with urethral dilatation and removal of left ureteral stent and left ureteral calculus. She will return for ESWL of her remaining left renal calculi on 11/29/18. She will require urethral dilatations in the future.     11/29/18, OR, ESWL of a 0.6 cm " left renal calculus.     03/12/19, successful cystoscopy with urethral dilatation a 26 Pakistani with mild bleeding. Trigone is erythematous, the remaining lower third of the bladder is also inflamed. We will maintain a 3 month schedule.     06/11/19, successful cystoscopy with urethral dilatation to 26 Pakistani with moderate pain. Trigone is erythematous with glomerulations. She is complaining of right flank pain. We are not sure if this is secondary to a UTI or new stones. Therefore I will order a 5 day course of Cipro and also a renal colic CAT scan for further evaluation.     09/03/19, successful cystoscopy with urethral dilatation to 26 Pakistani with minimal pain. Trigone does have glomerulations and erythema. The remainder of the bladder is normal. No longer right flank pain. Positive UTI for Escherichia coli resistant to Cipro, allergic to Macrobid, therefore Keflex. Renal colic CAT scan shows a 2 mm stone in the left kidney. Flow rate of 12 with a PVR of 0. I will begin twice a week Keflex 500 mg. However we will expand to a 4 month schedule for the coming year.     01/06/20, successful cystoscopy with urethral dilatation a 26 Pakistani with minimal discomfort. Still some mild glomerulations and erythema in the trigone. The remainder of the bladder is normal. No further flank pain. Urine culture showed no growth. Previous Escherichia coli was resistant to Cipro, the patient is allergic to Macrobid. She has been taking Keflex twice a week but claims that there is an unpleasant odor with that medication in her urine. We will switch to Bactrim DS twice a week. Flow rate of 9 with a PVR of 0. We will maintain a 4 month schedule until July.     05/22/20, successful cystoscopy with urethral dilatation to 26 Pakistani with minimal discomfort. Only mild erythema in the trigone and no further glomerulations. Flow rate of 12 cc/s with a PVR of 0. She continues on Bactrim double strength twice a week. She will return in 4 months.      09/25/20, successful cystoscopy with urethral dilatation a 26 [place] Islander with moderate pain. Moderate erythema in the trigone and base of the bladder. No glomerulations seen. Flow rate 22 cc/s with a PVR of 44 cc. She continues on Bactrim double strength twice a week. She was scheduled for her urine tests and a CAT scan but had delays. We will see her again in 4 months.     December 2020, patient tested Covid positive, no hospitalization.     March 2, 2021, successful cystoscopy with urethral dilatation to 26 [place] Islander of a dense urethral stricture with moderate pain. Moderate erythema in the trigone and mild erythema in the base of the bladder. No further glomerulations seen. No flow rate today. PVR 14 cc. We will no longer order a flow rate and PVR. She continues on double strength Bactrim twice a week. Urinalysis and urine culture are negative. Renal colic CAT scan identifies 2 separate 2 mm nonobstructing stones in the left kidney. She is very happy with the regimen and feels she is completely emptying with dilations. She has not had a UTI in 2-1/2 years, previously every other month for a period of 3 years. She will return in 4 months as she requests.     July 26, 2021, successful cystoscopy with urethral dilatation a 26 [place] Islander of a dense urethral stricture with minimal pain and no bleeding. Still moderate erythema posteriorly and in the trigone. However no further glomerulations. She wishes to maintain a 4-month regimen. She has not had a UTI in over 3 years on Bactrim twice a week.     November 29, 2021, successful cystoscopy with dilatation to 26 [place] Islander of an upward sloping dense urethral stricture with minimal pain and no bleeding. No further glomerulations. Erythema posteriorly and in the trigone. Both ureteral orifices were identified and producing urine. Patient wishes to maintain her 4-month schedule and is quite happy that it has been 3 full years without a UTI on Bactrim twice a week.     March 21, 2022,  successful cystoscopy with dilatation of an upward sloping dense urethral stricture to 26 Ugandan with minimal pain and no bleeding. No further glomerulations. Mild erythema posteriorly and moderate erythema in the trigone and bladder base. Both ureteral orifices were identified. Patient wishes to maintain her 4-month schedule and is very pleased that it is been for 4 years since her last UTI. Previously, every other month for a period of 18 months. She does continue on Bactrim DS twice a week.     July 19, 2022, successful cystoscopy with dilatation of an upward sloping dense urethral stricture to 26 Ugandan with minimal pain and no bleeding. Erythema is seen in the bladder base and trigone with a clear line of demarcation. No evidence of stones or tumors. Flow rate 22 cc/s, total volume 121 cc, PVR 0 cc. Patient has not had a UTI for over 4 years on her regimen. Previously every other month for period of 18 months. She continues on Bactrim DS twice a week. Urinalysis and urine culture were both negative. We will now expand to a 5-month regimen. Renal colic CAT scan identified 4 mm stones.     January 14, 2023, successful cystoscopy with dilatation of an upward sloping dense urethral stricture to 26 Ugandan with minimal discomfort. Erythema is seen in the bladder base and trigone once again. Both ureteral orifices were identified and producing urine. No evidence of stones or tumors. Today's flow rate 19 cc/s with a total volume of 88 cc, PVR 8 cc. Again, she has not had a UTI in over 5 years on this regimen. Previously she was experiencing a UTI every other month for a period of 18 months. She continues on Bactrim DS twice a week. We will maintain her 5-month regimen.     June 19, 2023, successful cystoscopy with dilatation of a dense upward sloping urethral stricture to 26 Ugandan with minimal pain. Erythema continues posteriorly. No tumors or stones. Both ureteral orifices were identified. Flow rate of 12 cc/s total  volume 52 cc, PVR 0 cc. She continues now with no UTI in over 5 years with this regimen. Previously a UTI every other month for 18 months. Urinalysis shows no blood. Urine culture no growth. Renal colic CAT scan identifies bilateral 3 mm calculi without obstruction. She continues on Bactrim DS twice a week we had a long discussion regarding expansion of her regimen. She has agreed to expand to 6 months but wishes to hold that pattern indefinitely and I have agreed.    February 26, 2024, successful cystoscopy with dilatation of an upward sloping dense urethral stricture to 26 Indonesian with minimal discomfort.  Once again, erythema is concentrated in the base.  No stones or tumors seen.  Flow rate 24 cc/s, total volume 117 cc, PVR 0 cc.  Now with no recurrent UTI over 6 years with this regimen.  Previously a UTI every other month for 18 months.  She continues on Bactrim DS twice a week.  She wishes to maintain her 6-month schedule indefinitely     PLAN:     #1 The patient will return in January 2025 and July 2024 for cystoscopy with urethral dilatation to 26 Indonesian, .. KEFLEX 250 mg every 12 hours 4 doses PROPHYLAXIS     #2 July of 2024 obtain a urine analysis, urine culture and renal colic CAT scan prior to that visit. Patient wishes to expand to 6-month regimen, however hold in 6 months indefinitely.     #3 continue Bactrim double strength 1 tablet by mouth every Wednesday and every Sunday    Physical Exam  Vitals and nursing note reviewed. Exam conducted with a chaperone present.   Constitutional:       Appearance: Normal appearance.   HENT:      Head: Normocephalic and atraumatic.   Pulmonary:      Effort: Pulmonary effort is normal.   Abdominal:      Palpations: Abdomen is soft.      Tenderness: There is no abdominal tenderness.   Genitourinary:     General: Normal vulva.      Vagina: No vaginal discharge.   Musculoskeletal:         General: Normal range of motion.      Cervical back: Normal range of motion and  neck supple.   Neurological:      General: No focal deficit present.      Mental Status: She is alert and oriented to person, place, and time.   Psychiatric:         Mood and Affect: Mood normal.         Behavior: Behavior normal.         This note was created with voice-recognition software and was not corrected for typographical or grammatical errors.

## 2024-02-26 NOTE — PROGRESS NOTES
Provider Impressions     58 year-old white female originally seen on 07/09/14. The patient passed 2 CALCULI, 0.7 and 0.9 cm. There was a question of a left STRICTURE on CAT scan. It recommended an intravenous pyelogram. CALCULI were sent for analysis. Patient is employed as a  at the [employer]. She has a positive family history of prostate cancer. She has a 10-pack-year cigarette smoking history.      CALCIUM OXALATE  stones     As part of her evaluation, a renal colic CAT scan was performed which identified a left URETERAL STRICTURE with poor emptying. Recommendation was for an intravenous pyelogram.      The patient returns today without any complaints. As stated, the stone analysis was CALCIUM OXALATE. The intravenous pyelogram only identified a 0.3 cm in diameter right RENAL CALCULUS. There was no evidence of obstruction or ureteral stricture     07/24/15, patient states that she is passed 2 STONES over the past year, one requiring an emergency room visit. She states that the STONES were bigger than the last time, which was 9 mm. Her most recent CAT scan shows several STONES in the left KIDNEY, the largest being 4 mm. Her urinalysis is negative.     7/22/2016â€“Established pt here today for f/u and and review of testing. Denies flank pain, dysuria, or hematuria.  Renal colic CTâ€“left 's renal CALCULI, at least 5 in number. Largest measuring 6 x 4 mm; urinalysis-negative     08/31/16, IVP with an addendum identifies multiple left RENAL CALCULI measuring 3 mm. The patient will return in 1 year.     06/14/17, patient has no new complaints. Urinalysis is negative. Renal colic CAT scan shows 5 STONES in the left kidney with the largest being 4 mm. She has no stones in the right kidney. She will return in 1 year. She was given an oxalate packet today.     07/23/18, patient has no new complaints. Urinalysis and urine culture are negative. Renal colic CAT scan shows 5 STONES. A 6 mm left upper pole  "STONE, a 2 mm left mid pole STONE and 3-4 mm STONES in the lower pole of the left kidney. She will return with a creatinine and IVP. We will discuss ESWL at that time.     08/20/18, IVP is completed and although there is some bowel gas obscuring, the upper pole large 8 mm STONE in the lower pole 5 mm STONES were seen again. The patient does wish to proceed with shockwave lithotripsy and will be scheduled for 10/11/18. Dr. Colbert will provide preoperative risk stratification.     10/15/18, OR, CYSTOSCOPY WITH URETHRAL DILATATION, LEFT RETROPYELOGRAM, PLACEMENT OF LEFT 6 Albanian BY 24 CM DOUBLE-J URETERAL STENT, ESWL OF A 0.9, 0.8 AND 0.6 CM RENAL CALCULI.     11/12/18, patient calls complaining that she is \"miserable\" with her stent. She is still has residual stone burden and is scheduled for ESWL on 11/29/18. However, due to her discomfort with the stent, she will be returning for stent removal in office. She understands that she may take the chance of a stone fragment obstructing the ureter. She is well aware of the benefits and risks and wishes to proceed.     11/17/18, in office cystoscopy with urethral dilatation and removal of left ureteral stent and left ureteral calculus. She will return for ESWL of her remaining left renal calculi on 11/29/18. She will require urethral dilatations in the future.     11/29/18, OR, ESWL of a 0.6 cm left renal calculus.     03/12/19, successful cystoscopy with urethral dilatation a 26 Scottish with mild bleeding. Trigone is erythematous, the remaining lower third of the bladder is also inflamed. We will maintain a 3 month schedule.     06/11/19, successful cystoscopy with urethral dilatation to 26 Scottish with moderate pain. Trigone is erythematous with glomerulations. She is complaining of right flank pain. We are not sure if this is secondary to a UTI or new stones. Therefore I will order a 5 day course of Cipro and also a renal colic CAT scan for further evaluation.   "   09/03/19, successful cystoscopy with urethral dilatation to 26 B[place]udian with minimal pain. Trigone does have glomerulations and erythema. The remainder of the bladder is normal. No longer right flank pain. Positive UTI for Escherichia coli resistant to Cipro, allergic to Macrobid, therefore Keflex. Renal colic CAT scan shows a 2 mm stone in the left kidney. Flow rate of 12 with a PVR of 0. I will begin twice a week Keflex 500 mg. However we will expand to a 4 month schedule for the coming year.     01/06/20, successful cystoscopy with urethral dilatation a 26 [place]udian with minimal discomfort. Still some mild glomerulations and erythema in the trigone. The remainder of the bladder is normal. No further flank pain. Urine culture showed no growth. Previous Escherichia coli was resistant to Cipro, the patient is allergic to Macrobid. She has been taking Keflex twice a week but claims that there is an unpleasant odor with that medication in her urine. We will switch to Bactrim DS twice a week. Flow rate of 9 with a PVR of 0. We will maintain a 4 month schedule until July.     05/22/20, successful cystoscopy with urethral dilatation to 26 [place]udian with minimal discomfort. Only mild erythema in the trigone and no further glomerulations. Flow rate of 12 cc/s with a PVR of 0. She continues on Bactrim double strength twice a week. She will return in 4 months.     09/25/20, successful cystoscopy with urethral dilatation a 26 [place]udian with moderate pain. Moderate erythema in the trigone and base of the bladder. No glomerulations seen. Flow rate 22 cc/s with a PVR of 44 cc. She continues on Bactrim double strength twice a week. She was scheduled for her urine tests and a CAT scan but had delays. We will see her again in 4 months.     December 2020, patient tested Covid positive, no hospitalization.     March 2, 2021, successful cystoscopy with urethral dilatation to 26 [place]udian of a dense urethral stricture with moderate pain. Moderate  erythema in the trigone and mild erythema in the base of the bladder. No further glomerulations seen. No flow rate today. PVR 14 cc. We will no longer order a flow rate and PVR. She continues on double strength Bactrim twice a week. Urinalysis and urine culture are negative. Renal colic CAT scan identifies 2 separate 2 mm nonobstructing stones in the left kidney. She is very happy with the regimen and feels she is completely emptying with dilations. She has not had a UTI in 2-1/2 years, previously every other month for a period of 3 years. She will return in 4 months as she requests.     July 26, 2021, successful cystoscopy with urethral dilatation a 26 Venezuelan of a dense urethral stricture with minimal pain and no bleeding. Still moderate erythema posteriorly and in the trigone. However no further glomerulations. She wishes to maintain a 4-month regimen. She has not had a UTI in over 3 years on Bactrim twice a week.     November 29, 2021, successful cystoscopy with dilatation to 26 Venezuelan of an upward sloping dense urethral stricture with minimal pain and no bleeding. No further glomerulations. Erythema posteriorly and in the trigone. Both ureteral orifices were identified and producing urine. Patient wishes to maintain her 4-month schedule and is quite happy that it has been 3 full years without a UTI on Bactrim twice a week.     March 21, 2022, successful cystoscopy with dilatation of an upward sloping dense urethral stricture to 26 Venezuelan with minimal pain and no bleeding. No further glomerulations. Mild erythema posteriorly and moderate erythema in the trigone and bladder base. Both ureteral orifices were identified. Patient wishes to maintain her 4-month schedule and is very pleased that it is been for 4 years since her last UTI. Previously, every other month for a period of 18 months. She does continue on Bactrim DS twice a week.     July 19, 2022, successful cystoscopy with dilatation of an upward sloping  dense urethral stricture to 26 Chinese with minimal pain and no bleeding. Erythema is seen in the bladder base and trigone with a clear line of demarcation. No evidence of stones or tumors. Flow rate 22 cc/s, total volume 121 cc, PVR 0 cc. Patient has not had a UTI for over 4 years on her regimen. Previously every other month for period of 18 months. She continues on Bactrim DS twice a week. Urinalysis and urine culture were both negative. We will now expand to a 5-month regimen. Renal colic CAT scan identified 4 mm stones.     January 14, 2023, successful cystoscopy with dilatation of an upward sloping dense urethral stricture to 26 Chinese with minimal discomfort. Erythema is seen in the bladder base and trigone once again. Both ureteral orifices were identified and producing urine. No evidence of stones or tumors. Today's flow rate 19 cc/s with a total volume of 88 cc, PVR 8 cc. Again, she has not had a UTI in over 5 years on this regimen. Previously she was experiencing a UTI every other month for a period of 18 months. She continues on Bactrim DS twice a week. We will maintain her 5-month regimen.     June 19, 2023, successful cystoscopy with dilatation of a dense upward sloping urethral stricture to 26 Chinese with minimal pain. Erythema continues posteriorly. No tumors or stones. Both ureteral orifices were identified. Flow rate of 12 cc/s total volume 52 cc, PVR 0 cc. She continues now with no UTI in over 5 years with this regimen. Previously a UTI every other month for 18 months. Urinalysis shows no blood. Urine culture no growth. Renal colic CAT scan identifies bilateral 3 mm calculi without obstruction. She continues on Bactrim DS twice a week we had a long discussion regarding expansion of her regimen. She has agreed to expand to 6 months but wishes to hold that pattern indefinitely and I have agreed.    February 26, 2024, successful cystoscopy with dilatation of an upward sloping dense urethral stricture  to 26 Beninese with minimal discomfort.  Once again, erythema is concentrated in the base.  No stones or tumors seen.  Flow rate 24 cc/s, total volume 117 cc, PVR 0 cc.  Now with no recurrent UTI over 6 years with this regimen.  Previously a UTI every other month for 18 months.  She continues on Bactrim DS twice a week.  She wishes to maintain her 6-month schedule indefinitely     PLAN:     #1 The patient will return in January 2025 and July 2024 for cystoscopy with urethral dilatation to 26 Beninese, .. KEFLEX 250 mg every 12 hours 4 doses PROPHYLAXIS     #2 July of 2024 obtain a urine analysis, urine culture and renal colic CAT scan prior to that visit. Patient wishes to expand to 6-month regimen, however hold in 6 months indefinitely.     #3 continue Bactrim double strength 1 tablet by mouth every Wednesday and every Sunday    Physical Exam  Vitals and nursing note reviewed. Exam conducted with a chaperone present.   Constitutional:       Appearance: Normal appearance.   HENT:      Head: Normocephalic and atraumatic.   Pulmonary:      Effort: Pulmonary effort is normal.   Abdominal:      Palpations: Abdomen is soft.      Tenderness: There is no abdominal tenderness.   Genitourinary:     General: Normal vulva.      Vagina: No vaginal discharge.   Musculoskeletal:         General: Normal range of motion.      Cervical back: Normal range of motion and neck supple.   Neurological:      General: No focal deficit present.      Mental Status: She is alert and oriented to person, place, and time.   Psychiatric:         Mood and Affect: Mood normal.         Behavior: Behavior normal.         This note was created with voice-recognition software and was not corrected for typographical or grammatical errors.

## 2024-02-26 NOTE — PATIENT INSTRUCTIONS
Patient Discussion/Summary     It was very nice to see you again. You had a successful cystoscopy with urethral dilatation to 26 Pashto today. Your bladder has improved with no further glomerulations. You had no residual urine today. With the agreement that you will maintain a 6-month schedule indefinitely. Continue taking Bactrim twice a week.       This note was created with voice-recognition software and was not corrected for typographical or grammatical errors

## 2024-02-26 NOTE — PROGRESS NOTES
"Patient ID: Dianne Schwab is a 58 y.o. female.    Procedures  Pt took macrodantin 50mg po as prescribed  Anesthesia: Local 2% Lidocaine  Instruments: 6F flexible disposable cystoscope, female dilators    Pt brought to procedure room and placed in dorsal lithotomy position. Pt draped and prepped in normal sterile fashion. 5ml lidocaine instilled into urethral meatus and 5ml instilled into vagina. Pt tolerated well.    I was present as chaperone for the entirety of the procedure Barbara Bynum  Cystoscopy performed by Dr. Oscar Eddy        Bedside \"Time Out\" Verification   Today's Date: 02/26/2024. I attest that this time out verification took place prior to the procedure.   Procedure: cysto/dil   RN/LPN/MA:SAMINA   Provider: [facility].   Verified By: RN/HERVEN/MA, DIANNE SCHWAB and Provider.   Prior to the start of the procedure a time out was taken and the following were verified: the identity of the patient using two patient identifiers, the correct procedure, the correct site marked as indicated, the correct positioning for the patient and the correct equipment was obtained.   Cystoscopy - female DIANNE SCHWAB identified using two (2) forms of identification.   Procedure: diagnostic cystourethroscopy.  Procedure Note: Time Started: 5:00pm. Time Completed: 3:32 PM  Indications for procedure: irritable voiding symptoms.   Discussed with patient: Risks, benefits, and alternative were discussed in detail. Patient appears to understand and agrees to proceed. Patient has signed the procedure consent form.    CYSTOSCOPY:    Cystoscopy today reveals a dense urethral stricture dilated to 26 Croatian with minimal discomfort.  Once inside the bladder, erythema is confined to the bladder base.  Flow rate 24 cc/s, total volume 117 cc, PVR 0 cc.    "

## 2024-06-04 DIAGNOSIS — N39.0 URINARY TRACT INFECTION WITHOUT HEMATURIA, SITE UNSPECIFIED: ICD-10-CM

## 2024-06-04 RX ORDER — CIPROFLOXACIN 500 MG/1
500 TABLET ORAL EVERY 12 HOURS
Qty: 10 TABLET | Refills: 0 | Status: SHIPPED | OUTPATIENT
Start: 2024-06-04 | End: 2024-06-09

## 2024-06-05 ENCOUNTER — LAB (OUTPATIENT)
Dept: LAB | Facility: LAB | Age: 59
End: 2024-06-05
Payer: COMMERCIAL

## 2024-06-05 DIAGNOSIS — N39.0 URINARY TRACT INFECTION WITHOUT HEMATURIA, SITE UNSPECIFIED: ICD-10-CM

## 2024-06-05 LAB
APPEARANCE UR: CLEAR
BILIRUB UR STRIP.AUTO-MCNC: NEGATIVE MG/DL
CAOX CRY #/AREA UR COMP ASSIST: ABNORMAL /HPF
COLOR UR: YELLOW
GLUCOSE UR STRIP.AUTO-MCNC: NEGATIVE MG/DL
HYALINE CASTS #/AREA URNS AUTO: ABNORMAL /LPF
KETONES UR STRIP.AUTO-MCNC: NEGATIVE MG/DL
LEUKOCYTE ESTERASE UR QL STRIP.AUTO: NEGATIVE
MUCOUS THREADS #/AREA URNS AUTO: ABNORMAL /LPF
NITRITE UR QL STRIP.AUTO: NEGATIVE
PH UR STRIP.AUTO: 5 [PH]
PROT UR STRIP.AUTO-MCNC: NEGATIVE MG/DL
RBC # UR STRIP.AUTO: ABNORMAL /UL
RBC #/AREA URNS AUTO: ABNORMAL /HPF
SP GR UR STRIP.AUTO: 1.02
UROBILINOGEN UR STRIP.AUTO-MCNC: <2 MG/DL
WBC #/AREA URNS AUTO: ABNORMAL /HPF

## 2024-06-05 PROCEDURE — 87086 URINE CULTURE/COLONY COUNT: CPT

## 2024-06-05 PROCEDURE — 81001 URINALYSIS AUTO W/SCOPE: CPT

## 2024-06-06 LAB — BACTERIA UR CULT: NO GROWTH

## 2024-07-05 ENCOUNTER — HOSPITAL ENCOUNTER (OUTPATIENT)
Dept: RADIOLOGY | Facility: CLINIC | Age: 59
Discharge: HOME | End: 2024-07-05
Payer: COMMERCIAL

## 2024-07-05 VITALS — WEIGHT: 165 LBS | HEIGHT: 65 IN | BODY MASS INDEX: 27.49 KG/M2

## 2024-07-05 DIAGNOSIS — Z12.31 SCREENING MAMMOGRAM FOR BREAST CANCER: ICD-10-CM

## 2024-07-05 DIAGNOSIS — Z12.31 ENCOUNTER FOR SCREENING MAMMOGRAM FOR MALIGNANT NEOPLASM OF BREAST: ICD-10-CM

## 2024-07-05 DIAGNOSIS — R31.9 HEMATURIA, UNSPECIFIED TYPE: ICD-10-CM

## 2024-07-05 PROCEDURE — 74176 CT ABD & PELVIS W/O CONTRAST: CPT

## 2024-07-05 PROCEDURE — 77067 SCR MAMMO BI INCL CAD: CPT

## 2024-07-08 ENCOUNTER — APPOINTMENT (OUTPATIENT)
Dept: RADIOLOGY | Facility: CLINIC | Age: 59
End: 2024-07-08
Payer: COMMERCIAL

## 2024-07-11 ENCOUNTER — APPOINTMENT (OUTPATIENT)
Dept: DERMATOLOGY | Facility: CLINIC | Age: 59
End: 2024-07-11
Payer: COMMERCIAL

## 2024-07-11 DIAGNOSIS — D18.01 HEMANGIOMA OF SKIN: ICD-10-CM

## 2024-07-11 DIAGNOSIS — L81.4 LENTIGO: ICD-10-CM

## 2024-07-11 DIAGNOSIS — L57.8 PHOTOAGING OF SKIN: ICD-10-CM

## 2024-07-11 DIAGNOSIS — D22.5 MELANOCYTIC NEVI OF TRUNK: ICD-10-CM

## 2024-07-11 DIAGNOSIS — L82.1 SEBORRHEIC KERATOSIS: ICD-10-CM

## 2024-07-11 DIAGNOSIS — Z12.83 ENCOUNTER FOR SCREENING FOR MALIGNANT NEOPLASM OF SKIN: Primary | ICD-10-CM

## 2024-07-11 DIAGNOSIS — L85.2 PUNCTATE PALMOPLANTAR KERATODERMA: ICD-10-CM

## 2024-07-11 PROCEDURE — 99213 OFFICE O/P EST LOW 20 MIN: CPT | Performed by: DERMATOLOGY

## 2024-07-11 PROCEDURE — 1036F TOBACCO NON-USER: CPT | Performed by: DERMATOLOGY

## 2024-07-11 RX ORDER — SULFAMETHOXAZOLE AND TRIMETHOPRIM 800; 160 MG/1; MG/1
TABLET ORAL
COMMUNITY
Start: 2024-07-02

## 2024-07-11 NOTE — PROGRESS NOTES
Subjective     Jade Schwab is a 58 y.o. female who presents for the following: Skin Check (Presents for FBSE. Denies personal or family hx of skin cancer. Denies areas of concern today. ).     Review of Systems:  No other skin or systemic complaints other than what is documented elsewhere in the note.    The following portions of the chart were reviewed this encounter and updated as appropriate:         Skin Cancer History  No skin cancer on file.      Specialty Problems          Dermatology Problems    Intrinsic eczema    Psoriasis    Hemangioma of skin and subcutaneous tissue    Melanocytic nevi of trunk    Melanocytic nevus    Other benign neoplasm of skin of left lower limb, including hip    Other melanin hyperpigmentation    Other seborrheic keratosis    Other skin changes due to chronic exposure to nonionizing radiation    Pruritus        Objective   Well appearing patient in no apparent distress; mood and affect are within normal limits.    A full examination was performed including scalp, head, eyes, ears, nose, lips, neck, chest, axillae, abdomen, back, buttocks, bilateral upper extremities, bilateral lower extremities, hands, feet, fingers, toes, fingernails, and toenails. All findings within normal limits unless otherwise noted below.    Assessment/Plan   1. Encounter for screening for malignant neoplasm of skin  No suspicious lesions noted on examination today    The risk of chronic, cumulative sun damage and risk of development of skin cancer was reviewed today.   The importance of sun protection was reviewed: including the use of a broad spectrum sunscreen of at least SPF 30 that protects against both UVA/UVB rays, with ingredients such as Zinc oxide or titanium dioxide, wearing sun protective clothing and sun avoidance. We reviewed the warning signs of non-melanoma skin cancer and ABCDEs of melanoma  Please follow up should you notice any new or changing pre-existing skin lesion.    Related  Procedures  Follow Up In Dermatology - Established Patient    2. Photoaging of skin  Mottled pigmentation with telangiectasias and brown reticular macules in sun exposed areas of the body.    The risk of chronic, cumulative sun damage and risk of development of skin cancer was reviewed today.   The importance of sun protection was reviewed: including the use of a broad spectrum sunscreen of at least SPF 30 that protects against both UVA/UVB rays, with ingredients such as Zinc oxide or titanium dioxide, wearing sun protective clothing and sun avoidance. We reviewed the warning signs of non-melanoma skin cancer and ABCDEs of melanoma  Please follow up should you notice any new or changing pre-existing skin lesion.    Related Procedures  Follow Up In Dermatology - Established Patient    3. Punctate palmoplantar keratoderma (2)  Left Hand - Anterior, Right Hand - Anterior  Punctate keratotic papules on bilateral hands    The nature of the diagnosis was reviewed.  Treatments including topical alpha hydroxyl acids or urea cream can be helpful, specifically Cerave SA and Ammonium lactate, both of which are over-the-counter.      Samples of cetaphil rough and bumpy skin provided    4. Hemangioma of skin  Cherry red papules    The benign nature of these skin lesions were reviewed, no treatment is necessary.   Please follow up for any new or pre-existing lesion that is changing in size, shape, color, becomes painful, tender, itches or bleed.    5. Lentigo  Scattered tan macules in sun-exposed areas.    These are benign skin lesions due to sun exposure. They will darken in response to sun exposure. They should be monitored for change in size, shape or color.  These lesions can be treated cosmetically with topical creams, liquid nitrogen and a variety of lasers.    6. Seborrheic keratosis  Brown, tan waxy macules and stuck on appearing papules and plaques    The benign nature of these skin lesions reviewed, reassure provided  and no further treatment needed at this time.   These lesions can be removed, if symptomatic (itching, bleeding, rubbing on clothing, painful), otherwise removal is considered cosmetic.     7. Melanocytic nevi of trunk (3)  Left Breast, Left Upper Back, Right Buttock  Tan-brown symmetric macules and papules  Right buttock - pink dome shaped symmetric papule    Clinically benign appearing nevi, no treatment is necessary.  The importance of sun protection was reviewed: including the use of a broad spectrum sunscreen that protects against both UVA/UVB rays, with ingredients such as Zinc oxide or titanium dioxide, wearing sun protective clothing and sun avoidance.   ABCDEs of melanoma reviewed.  Please follow up should you notice any new or changing pre-existing skin lesion.      Follow up in 1 year for fbse

## 2024-07-26 ENCOUNTER — APPOINTMENT (OUTPATIENT)
Dept: UROLOGY | Facility: CLINIC | Age: 59
End: 2024-07-26
Payer: COMMERCIAL

## 2024-07-26 VITALS
HEART RATE: 87 BPM | BODY MASS INDEX: 27.29 KG/M2 | RESPIRATION RATE: 16 BRPM | WEIGHT: 164.02 LBS | DIASTOLIC BLOOD PRESSURE: 80 MMHG | SYSTOLIC BLOOD PRESSURE: 109 MMHG

## 2024-07-26 DIAGNOSIS — N39.0 URINARY TRACT INFECTION WITHOUT HEMATURIA, SITE UNSPECIFIED: Primary | ICD-10-CM

## 2024-07-26 DIAGNOSIS — N35.12 POSTINFECTIVE URETHRAL STRICTURE IN FEMALE: ICD-10-CM

## 2024-07-26 DIAGNOSIS — N20.0 CALCULUS OF KIDNEY: Primary | ICD-10-CM

## 2024-07-26 LAB
POC APPEARANCE, URINE: CLEAR
POC BILIRUBIN, URINE: NEGATIVE
POC BLOOD, URINE: NEGATIVE
POC COLOR, URINE: YELLOW
POC GLUCOSE, URINE: NEGATIVE MG/DL
POC KETONES, URINE: NEGATIVE MG/DL
POC LEUKOCYTES, URINE: NEGATIVE
POC NITRITE,URINE: NEGATIVE
POC PH, URINE: 6 PH
POC PROTEIN, URINE: NEGATIVE MG/DL
POC SPECIFIC GRAVITY, URINE: 1.01
POC UROBILINOGEN, URINE: 0.2 EU/DL

## 2024-07-26 PROCEDURE — 51798 US URINE CAPACITY MEASURE: CPT | Performed by: UROLOGY

## 2024-07-26 PROCEDURE — 52281 CYSTOSCOPY AND TREATMENT: CPT | Performed by: UROLOGY

## 2024-07-26 PROCEDURE — 81003 URINALYSIS AUTO W/O SCOPE: CPT | Performed by: UROLOGY

## 2024-07-26 PROCEDURE — 99214 OFFICE O/P EST MOD 30 MIN: CPT | Performed by: UROLOGY

## 2024-07-26 RX ORDER — SULFAMETHOXAZOLE AND TRIMETHOPRIM 800; 160 MG/1; MG/1
1 TABLET ORAL SEE ADMIN INSTRUCTIONS
Qty: 30 TABLET | Refills: 3 | Status: SHIPPED | OUTPATIENT
Start: 2024-07-26 | End: 2024-07-29

## 2024-07-26 NOTE — LETTER
July 26, 2024     Nader Colbert MD  [address] [address]    Patient: Jade Schwab   YOB: 1965   Date of Visit: 7/26/2024       Dear Dr. Nader Colbert MD:    Thank you for referring Jdae Schwab to me for evaluation. Below are my notes for this consultation.  If you have questions, please do not hesitate to call me. I look forward to following your patient along with you.       Sincerely,     Oscar Eddy MD      CC: No Recipients  ______________________________________________________________________________________          Provider Impressions     59 year-old white female originally seen on 07/09/14. The patient passed 2 CALCULI, 0.7 and 0.9 cm. There was a question of a left STRICTURE on CAT scan. It recommended an intravenous pyelogram. CALCULI were sent for analysis. Patient is employed as a  at the [employer]. She has a positive family history of prostate cancer. She has a 10-pack-year cigarette smoking history.      CALCIUM OXALATE  stones     As part of her evaluation, a renal colic CAT scan was performed which identified a left URETERAL STRICTURE with poor emptying. Recommendation was for an intravenous pyelogram.      The patient returns today without any complaints. As stated, the stone analysis was CALCIUM OXALATE. The intravenous pyelogram only identified a 0.3 cm in diameter right RENAL CALCULUS. There was no evidence of obstruction or ureteral stricture     07/24/15, patient states that she is passed 2 STONES over the past year, one requiring an emergency room visit. She states that the STONES were bigger than the last time, which was 9 mm. Her most recent CAT scan shows several STONES in the left KIDNEY, the largest being 4 mm. Her urinalysis is negative.     7/22/2016â€“Established pt here today for f/u and and review of testing. Denies flank pain, dysuria, or hematuria.  Renal colic CTâ€“left 's renal CALCULI, at least 5 in  "number. Largest measuring 6 x 4 mm; urinalysis-negative     08/31/16, IVP with an addendum identifies multiple left RENAL CALCULI measuring 3 mm. The patient will return in 1 year.     06/14/17, patient has no new complaints. Urinalysis is negative. Renal colic CAT scan shows 5 STONES in the left kidney with the largest being 4 mm. She has no stones in the right kidney. She will return in 1 year. She was given an oxalate packet today.     07/23/18, patient has no new complaints. Urinalysis and urine culture are negative. Renal colic CAT scan shows 5 STONES. A 6 mm left upper pole STONE, a 2 mm left mid pole STONE and 3-4 mm STONES in the lower pole of the left kidney. She will return with a creatinine and IVP. We will discuss ESWL at that time.     08/20/18, IVP is completed and although there is some bowel gas obscuring, the upper pole large 8 mm STONE in the lower pole 5 mm STONES were seen again. The patient does wish to proceed with shockwave lithotripsy and will be scheduled for 10/11/18. Dr. Colbert will provide preoperative risk stratification.     10/15/18, OR, CYSTOSCOPY WITH URETHRAL DILATATION, LEFT RETROPYELOGRAM, PLACEMENT OF LEFT 6 Serbian BY 24 CM DOUBLE-J URETERAL STENT, ESWL OF A 0.9, 0.8 AND 0.6 CM RENAL CALCULI.     11/12/18, patient calls complaining that she is \"miserable\" with her stent. She is still has residual stone burden and is scheduled for ESWL on 11/29/18. However, due to her discomfort with the stent, she will be returning for stent removal in office. She understands that she may take the chance of a stone fragment obstructing the ureter. She is well aware of the benefits and risks and wishes to proceed.     11/17/18, in office cystoscopy with urethral dilatation and removal of left ureteral stent and left ureteral calculus. She will return for ESWL of her remaining left renal calculi on 11/29/18. She will require urethral dilatations in the future.     11/29/18, OR, ESWL of a 0.6 cm " left renal calculus.     03/12/19, successful cystoscopy with urethral dilatation a 26 Swazi with mild bleeding. Trigone is erythematous, the remaining lower third of the bladder is also inflamed. We will maintain a 3 month schedule.     06/11/19, successful cystoscopy with urethral dilatation to 26 Swazi with moderate pain. Trigone is erythematous with glomerulations. She is complaining of right flank pain. We are not sure if this is secondary to a UTI or new stones. Therefore I will order a 5 day course of Cipro and also a renal colic CAT scan for further evaluation.     09/03/19, successful cystoscopy with urethral dilatation to 26 Swazi with minimal pain. Trigone does have glomerulations and erythema. The remainder of the bladder is normal. No longer right flank pain. Positive UTI for Escherichia coli resistant to Cipro, allergic to Macrobid, therefore Keflex. Renal colic CAT scan shows a 2 mm stone in the left kidney. Flow rate of 12 with a PVR of 0. I will begin twice a week Keflex 500 mg. However we will expand to a 4 month schedule for the coming year.     01/06/20, successful cystoscopy with urethral dilatation a 26 Swazi with minimal discomfort. Still some mild glomerulations and erythema in the trigone. The remainder of the bladder is normal. No further flank pain. Urine culture showed no growth. Previous Escherichia coli was resistant to Cipro, the patient is allergic to Macrobid. She has been taking Keflex twice a week but claims that there is an unpleasant odor with that medication in her urine. We will switch to Bactrim DS twice a week. Flow rate of 9 with a PVR of 0. We will maintain a 4 month schedule until July.     05/22/20, successful cystoscopy with urethral dilatation to 26 Swazi with minimal discomfort. Only mild erythema in the trigone and no further glomerulations. Flow rate of 12 cc/s with a PVR of 0. She continues on Bactrim double strength twice a week. She will return in 4 months.      09/25/20, successful cystoscopy with urethral dilatation a 26 Bruneian with moderate pain. Moderate erythema in the trigone and base of the bladder. No glomerulations seen. Flow rate 22 cc/s with a PVR of 44 cc. She continues on Bactrim double strength twice a week. She was scheduled for her urine tests and a CAT scan but had delays. We will see her again in 4 months.     December 2020, patient tested Covid positive, no hospitalization.     March 2, 2021, successful cystoscopy with urethral dilatation to 26 Bruneian of a dense urethral stricture with moderate pain. Moderate erythema in the trigone and mild erythema in the base of the bladder. No further glomerulations seen. No flow rate today. PVR 14 cc. We will no longer order a flow rate and PVR. She continues on double strength Bactrim twice a week. Urinalysis and urine culture are negative. Renal colic CAT scan identifies 2 separate 2 mm nonobstructing stones in the left kidney. She is very happy with the regimen and feels she is completely emptying with dilations. She has not had a UTI in 2-1/2 years, previously every other month for a period of 3 years. She will return in 4 months as she requests.     July 26, 2021, successful cystoscopy with urethral dilatation a 26 Bruneian of a dense urethral stricture with minimal pain and no bleeding. Still moderate erythema posteriorly and in the trigone. However no further glomerulations. She wishes to maintain a 4-month regimen. She has not had a UTI in over 3 years on Bactrim twice a week.     November 29, 2021, successful cystoscopy with dilatation to 26 Bruneian of an upward sloping dense urethral stricture with minimal pain and no bleeding. No further glomerulations. Erythema posteriorly and in the trigone. Both ureteral orifices were identified and producing urine. Patient wishes to maintain her 4-month schedule and is quite happy that it has been 3 full years without a UTI on Bactrim twice a week.     March 21, 2022,  successful cystoscopy with dilatation of an upward sloping dense urethral stricture to 26 Citizen of [place] with minimal pain and no bleeding. No further glomerulations. Mild erythema posteriorly and moderate erythema in the trigone and bladder base. Both ureteral orifices were identified. Patient wishes to maintain her 4-month schedule and is very pleased that it is been for 4 years since her last UTI. Previously, every other month for a period of 18 months. She does continue on Bactrim DS twice a week.     July 19, 2022, successful cystoscopy with dilatation of an upward sloping dense urethral stricture to 26 Citizen of [place] with minimal pain and no bleeding. Erythema is seen in the bladder base and trigone with a clear line of demarcation. No evidence of stones or tumors. Flow rate 22 cc/s, total volume 121 cc, PVR 0 cc. Patient has not had a UTI for over 4 years on her regimen. Previously every other month for period of 18 months. She continues on Bactrim DS twice a week. Urinalysis and urine culture were both negative. We will now expand to a 5-month regimen. Renal colic CAT scan identified 4 mm stones.     January 14, 2023, successful cystoscopy with dilatation of an upward sloping dense urethral stricture to 26 Citizen of [place] with minimal discomfort. Erythema is seen in the bladder base and trigone once again. Both ureteral orifices were identified and producing urine. No evidence of stones or tumors. Today's flow rate 19 cc/s with a total volume of 88 cc, PVR 8 cc. Again, she has not had a UTI in over 5 years on this regimen. Previously she was experiencing a UTI every other month for a period of 18 months. She continues on Bactrim DS twice a week. We will maintain her 5-month regimen.     June 19, 2023, successful cystoscopy with dilatation of a dense upward sloping urethral stricture to 26 Citizen of [place] with minimal pain. Erythema continues posteriorly. No tumors or stones. Both ureteral orifices were identified. Flow rate of 12 cc/s total  volume 52 cc, PVR 0 cc. She continues now with no UTI in over 5 years with this regimen. Previously a UTI every other month for 18 months. Urinalysis shows no blood. Urine culture no growth. Renal colic CAT scan identifies bilateral 3 mm calculi without obstruction. She continues on Bactrim DS twice a week we had a long discussion regarding expansion of her regimen. She has agreed to expand to 6 months but wishes to hold that pattern indefinitely and I have agreed.     February 26, 2024, successful cystoscopy with dilatation of an upward sloping dense urethral stricture to 26 [place] with minimal discomfort.  Once again, erythema is concentrated in the base.  No stones or tumors seen.  Flow rate 24 cc/s, total volume 117 cc, PVR 0 cc.  Now with no recurrent UTI over 6 years with this regimen.  Previously a UTI every other month for 18 months.  She continues on Bactrim DS twice a week.  She wishes to maintain her 6-month schedule indefinitely    July 26, 2024, successful cystoscopy with dilatation of a dense upward sloping urethral stricture to 26 [place] with minimal pain.  No bleeding.  Erythema concentrated posteriorly and in the base.  No stones or tumors identified.  PVR 0 cc.  She continues on Bactrim double strength twice a week.  Urinalysis shows no blood.  Urine culture no growth.  Renal colic CAT scan shows multiple, approximately 6, nonobstructing left renal calculi ranging from 2 to 4 mm.  She wishes to maintain her 6-month schedule indefinitely.     PLAN:     #1 The patient will return in January 2025 and July 2025 for cystoscopy with urethral dilatation to 26 [place], .. KEFLEX 250 mg every 12 hours 4 doses PROPHYLAXIS     #2 July of 2025 obtain a urine analysis, urine culture and renal colic CAT scan prior to that visit. Patient wishes to hold in 6 months indefinitely.     #3 continue Bactrim double strength 1 tablet by mouth every Wednesday and every Sunday     Physical Exam  Vitals and nursing note  reviewed. Exam conducted with a chaperone present.   Constitutional:       Appearance: Normal appearance.   HENT:      Head: Normocephalic and atraumatic.   Pulmonary:      Effort: Pulmonary effort is normal.   Abdominal:      Palpations: Abdomen is soft.      Tenderness: There is no abdominal tenderness.   Genitourinary:     General: Normal vulva.      Vagina: No vaginal discharge.   Musculoskeletal:         General: Normal range of motion.      Cervical back: Normal range of motion and neck supple.   Neurological:      General: No focal deficit present.      Mental Status: She is alert and oriented to person, place, and time.   Psychiatric:         Mood and Affect: Mood normal.         Behavior: Behavior normal.        This note was created with voice-recognition software and was not corrected for typographical or grammatical errors.

## 2024-07-26 NOTE — PATIENT INSTRUCTIONS
Patient Discussion/Summary     It was very nice to see you again. You had a successful cystoscopy with urethral dilatation to 26 Estonian today. Your bladder has improved with no further glomerulations. You had no residual urine today.  Urine culture showed no growth.  Renal colic CAT scan showed approximately 6 stones in the left kidney measuring from 2 to 4 mm.  We will continue to follow.  With the agreement that you will maintain a 6-month schedule indefinitely. Continue taking Bactrim twice a week.       This note was created with voice-recognition software and was not corrected for typographical or grammatical errors

## 2024-07-26 NOTE — PROGRESS NOTES
Provider Impressions     59 year-old white female originally seen on 07/09/14. The patient passed 2 CALCULI, 0.7 and 0.9 cm. There was a question of a left STRICTURE on CAT scan. It recommended an intravenous pyelogram. CALCULI were sent for analysis. Patient is employed as a  at the [email or web address]. She has a positive family history of prostate cancer. She has a 10-pack-year cigarette smoking history.      CALCIUM OXALATE  stones     As part of her evaluation, a renal colic CAT scan was performed which identified a left URETERAL STRICTURE with poor emptying. Recommendation was for an intravenous pyelogram.      The patient returns today without any complaints. As stated, the stone analysis was CALCIUM OXALATE. The intravenous pyelogram only identified a 0.3 cm in diameter right RENAL CALCULUS. There was no evidence of obstruction or ureteral stricture     07/24/15, patient states that she is passed 2 STONES over the past year, one requiring an emergency room visit. She states that the STONES were bigger than the last time, which was 9 mm. Her most recent CAT scan shows several STONES in the left KIDNEY, the largest being 4 mm. Her urinalysis is negative.     7/22/2016â€“Established pt here today for f/u and and review of testing. Denies flank pain, dysuria, or hematuria.  Renal colic CTâ€“left 's renal CALCULI, at least 5 in number. Largest measuring 6 x 4 mm; urinalysis-negative     08/31/16, IVP with an addendum identifies multiple left RENAL CALCULI measuring 3 mm. The patient will return in 1 year.     06/14/17, patient has no new complaints. Urinalysis is negative. Renal colic CAT scan shows 5 STONES in the left kidney with the largest being 4 mm. She has no stones in the right kidney. She will return in 1 year. She was given an oxalate packet today.     07/23/18, patient has no new complaints. Urinalysis and urine culture are negative. Renal colic CAT scan shows 5 STONES. A 6 mm left upper  "pole STONE, a 2 mm left mid pole STONE and 3-4 mm STONES in the lower pole of the left kidney. She will return with a creatinine and IVP. We will discuss ESWL at that time.     08/20/18, IVP is completed and although there is some bowel gas obscuring, the upper pole large 8 mm STONE in the lower pole 5 mm STONES were seen again. The patient does wish to proceed with shockwave lithotripsy and will be scheduled for 10/11/18. Dr. Colbert will provide preoperative risk stratification.     10/15/18, OR, CYSTOSCOPY WITH URETHRAL DILATATION, LEFT RETROPYELOGRAM, PLACEMENT OF LEFT 6 [place] BY 24 CM DOUBLE-J URETERAL STENT, ESWL OF A 0.9, 0.8 AND 0.6 CM RENAL CALCULI.     11/12/18, patient calls complaining that she is \"miserable\" with her stent. She is still has residual stone burden and is scheduled for ESWL on 11/29/18. However, due to her discomfort with the stent, she will be returning for stent removal in office. She understands that she may take the chance of a stone fragment obstructing the ureter. She is well aware of the benefits and risks and wishes to proceed.     11/17/18, in office cystoscopy with urethral dilatation and removal of left ureteral stent and left ureteral calculus. She will return for ESWL of her remaining left renal calculi on 11/29/18. She will require urethral dilatations in the future.     11/29/18, OR, ESWL of a 0.6 cm left renal calculus.     03/12/19, successful cystoscopy with urethral dilatation a 26 Hong Konger with mild bleeding. Trigone is erythematous, the remaining lower third of the bladder is also inflamed. We will maintain a 3 month schedule.     06/11/19, successful cystoscopy with urethral dilatation to 26 Hong Konger with moderate pain. Trigone is erythematous with glomerulations. She is complaining of right flank pain. We are not sure if this is secondary to a UTI or new stones. Therefore I will order a 5 day course of Cipro and also a renal colic CAT scan for further evaluation.   "   09/03/19, successful cystoscopy with urethral dilatation to 26 Filipino with minimal pain. Trigone does have glomerulations and erythema. The remainder of the bladder is normal. No longer right flank pain. Positive UTI for Escherichia coli resistant to Cipro, allergic to Macrobid, therefore Keflex. Renal colic CAT scan shows a 2 mm stone in the left kidney. Flow rate of 12 with a PVR of 0. I will begin twice a week Keflex 500 mg. However we will expand to a 4 month schedule for the coming year.     01/06/20, successful cystoscopy with urethral dilatation a 26 Filipino with minimal discomfort. Still some mild glomerulations and erythema in the trigone. The remainder of the bladder is normal. No further flank pain. Urine culture showed no growth. Previous Escherichia coli was resistant to Cipro, the patient is allergic to Macrobid. She has been taking Keflex twice a week but claims that there is an unpleasant odor with that medication in her urine. We will switch to Bactrim DS twice a week. Flow rate of 9 with a PVR of 0. We will maintain a 4 month schedule until July.     05/22/20, successful cystoscopy with urethral dilatation to 26 Filipino with minimal discomfort. Only mild erythema in the trigone and no further glomerulations. Flow rate of 12 cc/s with a PVR of 0. She continues on Bactrim double strength twice a week. She will return in 4 months.     09/25/20, successful cystoscopy with urethral dilatation a 26 Filipino with moderate pain. Moderate erythema in the trigone and base of the bladder. No glomerulations seen. Flow rate 22 cc/s with a PVR of 44 cc. She continues on Bactrim double strength twice a week. She was scheduled for her urine tests and a CAT scan but had delays. We will see her again in 4 months.     December 2020, patient tested Covid positive, no hospitalization.     March 2, 2021, successful cystoscopy with urethral dilatation to 26 Filipino of a dense urethral stricture with moderate pain. Moderate  erythema in the trigone and mild erythema in the base of the bladder. No further glomerulations seen. No flow rate today. PVR 14 cc. We will no longer order a flow rate and PVR. She continues on double strength Bactrim twice a week. Urinalysis and urine culture are negative. Renal colic CAT scan identifies 2 separate 2 mm nonobstructing stones in the left kidney. She is very happy with the regimen and feels she is completely emptying with dilations. She has not had a UTI in 2-1/2 years, previously every other month for a period of 3 years. She will return in 4 months as she requests.     July 26, 2021, successful cystoscopy with urethral dilatation a 26 Cameroonian of a dense urethral stricture with minimal pain and no bleeding. Still moderate erythema posteriorly and in the trigone. However no further glomerulations. She wishes to maintain a 4-month regimen. She has not had a UTI in over 3 years on Bactrim twice a week.     November 29, 2021, successful cystoscopy with dilatation to 26 Cameroonian of an upward sloping dense urethral stricture with minimal pain and no bleeding. No further glomerulations. Erythema posteriorly and in the trigone. Both ureteral orifices were identified and producing urine. Patient wishes to maintain her 4-month schedule and is quite happy that it has been 3 full years without a UTI on Bactrim twice a week.     March 21, 2022, successful cystoscopy with dilatation of an upward sloping dense urethral stricture to 26 Cameroonian with minimal pain and no bleeding. No further glomerulations. Mild erythema posteriorly and moderate erythema in the trigone and bladder base. Both ureteral orifices were identified. Patient wishes to maintain her 4-month schedule and is very pleased that it is been for 4 years since her last UTI. Previously, every other month for a period of 18 months. She does continue on Bactrim DS twice a week.     July 19, 2022, successful cystoscopy with dilatation of an upward sloping  dense urethral stricture to 26 Syrian with minimal pain and no bleeding. Erythema is seen in the bladder base and trigone with a clear line of demarcation. No evidence of stones or tumors. Flow rate 22 cc/s, total volume 121 cc, PVR 0 cc. Patient has not had a UTI for over 4 years on her regimen. Previously every other month for period of 18 months. She continues on Bactrim DS twice a week. Urinalysis and urine culture were both negative. We will now expand to a 5-month regimen. Renal colic CAT scan identified 4 mm stones.     January 14, 2023, successful cystoscopy with dilatation of an upward sloping dense urethral stricture to 26 Syrian with minimal discomfort. Erythema is seen in the bladder base and trigone once again. Both ureteral orifices were identified and producing urine. No evidence of stones or tumors. Today's flow rate 19 cc/s with a total volume of 88 cc, PVR 8 cc. Again, she has not had a UTI in over 5 years on this regimen. Previously she was experiencing a UTI every other month for a period of 18 months. She continues on Bactrim DS twice a week. We will maintain her 5-month regimen.     June 19, 2023, successful cystoscopy with dilatation of a dense upward sloping urethral stricture to 26 Syrian with minimal pain. Erythema continues posteriorly. No tumors or stones. Both ureteral orifices were identified. Flow rate of 12 cc/s total volume 52 cc, PVR 0 cc. She continues now with no UTI in over 5 years with this regimen. Previously a UTI every other month for 18 months. Urinalysis shows no blood. Urine culture no growth. Renal colic CAT scan identifies bilateral 3 mm calculi without obstruction. She continues on Bactrim DS twice a week we had a long discussion regarding expansion of her regimen. She has agreed to expand to 6 months but wishes to hold that pattern indefinitely and I have agreed.     February 26, 2024, successful cystoscopy with dilatation of an upward sloping dense urethral stricture  to 26 Uruguayan with minimal discomfort.  Once again, erythema is concentrated in the base.  No stones or tumors seen.  Flow rate 24 cc/s, total volume 117 cc, PVR 0 cc.  Now with no recurrent UTI over 6 years with this regimen.  Previously a UTI every other month for 18 months.  She continues on Bactrim DS twice a week.  She wishes to maintain her 6-month schedule indefinitely    July 26, 2024, successful cystoscopy with dilatation of a dense upward sloping urethral stricture to 26 [place]ruguayan with minimal pain.  No bleeding.  Erythema concentrated posteriorly and in the base.  No stones or tumors identified.  PVR 0 cc.  She continues on Bactrim double strength twice a week.  Urinalysis shows no blood.  Urine culture no growth.  Renal colic CAT scan shows multiple, approximately 6, nonobstructing left renal calculi ranging from 2 to 4 mm.  She wishes to maintain her 6-month schedule indefinitely.     PLAN:     #1 The patient will return in January 2025 and July 2025 for cystoscopy with urethral dilatation to 26 Uruguayan, .. KEFLEX 250 mg every 12 hours 4 doses PROPHYLAXIS     #2 July of 2025 obtain a urine analysis, urine culture and renal colic CAT scan prior to that visit. Patient wishes to hold in 6 months indefinitely.     #3 continue Bactrim double strength 1 tablet by mouth every Wednesday and every Sunday     Physical Exam  Vitals and nursing note reviewed. Exam conducted with a chaperone present.   Constitutional:       Appearance: Normal appearance.   HENT:      Head: Normocephalic and atraumatic.   Pulmonary:      Effort: Pulmonary effort is normal.   Abdominal:      Palpations: Abdomen is soft.      Tenderness: There is no abdominal tenderness.   Genitourinary:     General: Normal vulva.      Vagina: No vaginal discharge.   Musculoskeletal:         General: Normal range of motion.      Cervical back: Normal range of motion and neck supple.   Neurological:      General: No focal deficit present.      Mental Status:  She is alert and oriented to person, place, and time.   Psychiatric:         Mood and Affect: Mood normal.         Behavior: Behavior normal.        This note was created with voice-recognition software and was not corrected for typographical or grammatical errors.

## 2024-07-26 NOTE — PROGRESS NOTES
"Patient ID: Dianne Schwab is a 59 y.o. female.  Pt denies any pain today. States has not had any recent hospital admits or surgeries since their last visit. Denies any concerns about falling or safety. [facility]    Procedures  Pt took macrodantin 50mg po as prescribed  Anesthesia: Local 2% Lidocaine  Instruments: 6F flexible disposable cystoscope, female dilators     Pt brought to procedure room and placed in dorsal lithotomy position. Pt draped and prepped in normal sterile fashion. 5ml lidocaine instilled into urethral meatus and 5ml instilled into vagina. Pt tolerated well.     I was present as chaperone for the entirety of the procedure Barbara Bynum  Cystoscopy performed by Dr. Oscar Eddy           Bedside \"Time Out\" Verification   Today's Date: 07/26/2024. I attest that this time out verification took place prior to the procedure.   Procedure: cysto/dil   RN/LPN/MA:SAMINA   Provider: [facility].   Verified By: RN/LPN/MA, DIANNE SCHWAB and Provider.   Prior to the start of the procedure a time out was taken and the following were verified: the identity of the patient using two patient identifiers, the correct procedure, the correct site marked as indicated, the correct positioning for the patient and the correct equipment was obtained.   Cystoscopy - female DIANNE SCHWAB identified using two (2) forms of identification.   Procedure: diagnostic cystourethroscopy.  Procedure Note: Time Started: 3:00pm. Time Completed: 3:18 PM  Indications for procedure: irritable voiding symptoms.   Discussed with patient: Risks, benefits, and alternative were discussed in detail. Patient appears to understand and agrees to proceed. Patient has signed the procedure consent form.    CYSTOSCOPY:    Cystoscopy today reveals an upward sloping dense urethral stricture dilated to 26 Lao with minimal discomfort.  Once inside the bladder, both ureteral orifices were identified.  No evidence of stones or tumors.  Erythema is concentrated " posteriorly and in the base.  PVR 0 cc.

## 2024-10-02 NOTE — PROGRESS NOTES
"Subjective   Patient ID: Jade Schwab is a 59 y.o. female who presents for check up.  HPI    Would you recommend a covid vaccine  Will be getting Flu shot at work     Hypertension stable  No chest pain or shortness of breath    Renal stone stable keep follow-up with urology    Low back pain stable  No progression or worsening     Review of Systems   Constitutional:  Negative for activity change and fatigue.   HENT:  Negative for congestion and sore throat.    Eyes:  Negative for discharge.   Respiratory:  Negative for cough, chest tightness and shortness of breath.    Cardiovascular:  Negative for chest pain and leg swelling.   Gastrointestinal:  Negative for abdominal pain, blood in stool, constipation, diarrhea, nausea and vomiting.   Endocrine: Negative for cold intolerance and heat intolerance.   Genitourinary:  Negative for difficulty urinating and hematuria.   Musculoskeletal:  Negative for arthralgias, back pain, gait problem, myalgias and neck pain.   Allergic/Immunologic: Negative for environmental allergies.   Neurological:  Negative for dizziness, syncope, weakness, numbness and headaches.   Hematological:  Negative for adenopathy. Does not bruise/bleed easily.   Psychiatric/Behavioral:  Negative for dysphoric mood. The patient is not nervous/anxious.    All other systems reviewed and are negative.      Objective   /86 (BP Location: Right arm, BP Cuff Size: Large adult)   Pulse 85   Ht 1.651 m (5' 5\")   Wt 76.7 kg (169 lb 3.2 oz)   SpO2 96%   BMI 28.16 kg/m²    Physical Exam  Vitals and nursing note reviewed.   Constitutional:       General: She is not in acute distress.     Appearance: Normal appearance.   HENT:      Head: Normocephalic and atraumatic.      Right Ear: Tympanic membrane, ear canal and external ear normal.      Left Ear: Tympanic membrane, ear canal and external ear normal.      Nose: Nose normal.      Mouth/Throat:      Mouth: Mucous membranes are moist.      Pharynx: " Oropharynx is clear. No oropharyngeal exudate or posterior oropharyngeal erythema.   Eyes:      Extraocular Movements: Extraocular movements intact.      Conjunctiva/sclera: Conjunctivae normal.      Pupils: Pupils are equal, round, and reactive to light.   Cardiovascular:      Rate and Rhythm: Normal rate and regular rhythm.      Pulses: Normal pulses.      Heart sounds: Normal heart sounds. No murmur heard.  Pulmonary:      Effort: Pulmonary effort is normal. No respiratory distress.      Breath sounds: Normal breath sounds. No wheezing or rales.   Abdominal:      General: Abdomen is flat. Bowel sounds are normal. There is no distension.      Palpations: Abdomen is soft. There is no mass.      Tenderness: There is no abdominal tenderness.   Musculoskeletal:         General: No swelling or deformity. Normal range of motion.      Cervical back: Normal range of motion and neck supple.      Right lower leg: No edema.      Left lower leg: No edema.   Lymphadenopathy:      Cervical: No cervical adenopathy.   Skin:     General: Skin is warm and dry.      Capillary Refill: Capillary refill takes less than 2 seconds.      Findings: No lesion or rash.   Neurological:      General: No focal deficit present.      Mental Status: She is alert and oriented to person, place, and time.      Cranial Nerves: No cranial nerve deficit.      Motor: No weakness.   Psychiatric:         Mood and Affect: Mood normal.         Behavior: Behavior normal.         Thought Content: Thought content normal.         Judgment: Judgment normal.         Assessment/Plan   Problem List Items Addressed This Visit       Essential hypertension    Relevant Orders    Lipid Panel    CBC and Auto Differential    Comprehensive Metabolic Panel    Follow Up In [facility] - PCP    Lumbar radiculopathy    Calculus of kidney     Other Visit Diagnoses       Essential (primary) hypertension    -  Primary    Relevant Medications    losartan (Cozaar) 100 mg  tablet            Patient education provided.  Stay current with age appropriate health maintenance as instructed.  Appointment here or ER with new or worsening symptoms'  Keep appropriate follow-up visit.  Stay current with proper immunizations     Refill as above    blood work as above  Get immunizations  Recheck 6 months and as needed

## 2024-10-10 ENCOUNTER — APPOINTMENT (OUTPATIENT)
Dept: PRIMARY CARE | Facility: CLINIC | Age: 59
End: 2024-10-10
Payer: COMMERCIAL

## 2024-10-10 VITALS
WEIGHT: 169.2 LBS | SYSTOLIC BLOOD PRESSURE: 123 MMHG | HEIGHT: 65 IN | BODY MASS INDEX: 28.19 KG/M2 | HEART RATE: 85 BPM | OXYGEN SATURATION: 96 % | DIASTOLIC BLOOD PRESSURE: 86 MMHG

## 2024-10-10 DIAGNOSIS — I10 ESSENTIAL (PRIMARY) HYPERTENSION: Primary | ICD-10-CM

## 2024-10-10 DIAGNOSIS — I10 ESSENTIAL HYPERTENSION: ICD-10-CM

## 2024-10-10 DIAGNOSIS — N20.0 CALCULUS OF KIDNEY: ICD-10-CM

## 2024-10-10 DIAGNOSIS — M54.16 LUMBAR RADICULOPATHY: ICD-10-CM

## 2024-10-10 PROCEDURE — 3008F BODY MASS INDEX DOCD: CPT | Performed by: FAMILY MEDICINE

## 2024-10-10 PROCEDURE — 99214 OFFICE O/P EST MOD 30 MIN: CPT | Performed by: FAMILY MEDICINE

## 2024-10-10 PROCEDURE — 3074F SYST BP LT 130 MM HG: CPT | Performed by: FAMILY MEDICINE

## 2024-10-10 PROCEDURE — 3079F DIAST BP 80-89 MM HG: CPT | Performed by: FAMILY MEDICINE

## 2024-10-10 RX ORDER — LOSARTAN POTASSIUM 100 MG/1
100 TABLET ORAL DAILY
Qty: 90 TABLET | Refills: 3 | Status: SHIPPED | OUTPATIENT
Start: 2024-10-10

## 2024-10-10 ASSESSMENT — ENCOUNTER SYMPTOMS
BACK PAIN: 0
CHEST TIGHTNESS: 0
BRUISES/BLEEDS EASILY: 0
HEADACHES: 0
DYSPHORIC MOOD: 0
SORE THROAT: 0
WEAKNESS: 0
NECK PAIN: 0
VOMITING: 0
NERVOUS/ANXIOUS: 0
ACTIVITY CHANGE: 0
DIFFICULTY URINATING: 0
COUGH: 0
HEMATURIA: 0
ARTHRALGIAS: 0
CONSTIPATION: 0
NUMBNESS: 0
EYE DISCHARGE: 0
ABDOMINAL PAIN: 0
MYALGIAS: 0
DIARRHEA: 0
SHORTNESS OF BREATH: 0
NAUSEA: 0
FATIGUE: 0
DIZZINESS: 0
BLOOD IN STOOL: 0
ADENOPATHY: 0

## 2024-10-14 ENCOUNTER — LAB (OUTPATIENT)
Dept: LAB | Facility: LAB | Age: 59
End: 2024-10-14
Payer: COMMERCIAL

## 2024-10-14 DIAGNOSIS — I10 ESSENTIAL HYPERTENSION: ICD-10-CM

## 2024-10-14 LAB
ALBUMIN SERPL BCP-MCNC: 4.4 G/DL (ref 3.4–5)
ALP SERPL-CCNC: 134 U/L (ref 33–110)
ALT SERPL W P-5'-P-CCNC: 13 U/L (ref 7–45)
ANION GAP SERPL CALC-SCNC: 11 MMOL/L (ref 10–20)
AST SERPL W P-5'-P-CCNC: 17 U/L (ref 9–39)
BASOPHILS # BLD AUTO: 0.07 X10*3/UL (ref 0–0.1)
BASOPHILS NFR BLD AUTO: 1.1 %
BILIRUB SERPL-MCNC: 0.9 MG/DL (ref 0–1.2)
BUN SERPL-MCNC: 13 MG/DL (ref 6–23)
CALCIUM SERPL-MCNC: 9.5 MG/DL (ref 8.6–10.3)
CHLORIDE SERPL-SCNC: 106 MMOL/L (ref 98–107)
CHOLEST SERPL-MCNC: 163 MG/DL (ref 0–199)
CHOLESTEROL/HDL RATIO: 2.8
CO2 SERPL-SCNC: 28 MMOL/L (ref 21–32)
CREAT SERPL-MCNC: 1.15 MG/DL (ref 0.5–1.05)
EGFRCR SERPLBLD CKD-EPI 2021: 55 ML/MIN/1.73M*2
EOSINOPHIL # BLD AUTO: 0.18 X10*3/UL (ref 0–0.7)
EOSINOPHIL NFR BLD AUTO: 2.8 %
ERYTHROCYTE [DISTWIDTH] IN BLOOD BY AUTOMATED COUNT: 13 % (ref 11.5–14.5)
GLUCOSE SERPL-MCNC: 96 MG/DL (ref 74–99)
HCT VFR BLD AUTO: 42.9 % (ref 36–46)
HDLC SERPL-MCNC: 58.7 MG/DL
HGB BLD-MCNC: 13.7 G/DL (ref 12–16)
IMM GRANULOCYTES # BLD AUTO: 0.03 X10*3/UL (ref 0–0.7)
IMM GRANULOCYTES NFR BLD AUTO: 0.5 % (ref 0–0.9)
LDLC SERPL CALC-MCNC: 81 MG/DL
LYMPHOCYTES # BLD AUTO: 2.39 X10*3/UL (ref 1.2–4.8)
LYMPHOCYTES NFR BLD AUTO: 36.9 %
MCH RBC QN AUTO: 31 PG (ref 26–34)
MCHC RBC AUTO-ENTMCNC: 31.9 G/DL (ref 32–36)
MCV RBC AUTO: 97 FL (ref 80–100)
MONOCYTES # BLD AUTO: 0.59 X10*3/UL (ref 0.1–1)
MONOCYTES NFR BLD AUTO: 9.1 %
NEUTROPHILS # BLD AUTO: 3.21 X10*3/UL (ref 1.2–7.7)
NEUTROPHILS NFR BLD AUTO: 49.6 %
NON HDL CHOLESTEROL: 104 MG/DL (ref 0–149)
NRBC BLD-RTO: 0 /100 WBCS (ref 0–0)
PLATELET # BLD AUTO: 300 X10*3/UL (ref 150–450)
POTASSIUM SERPL-SCNC: 4.6 MMOL/L (ref 3.5–5.3)
PROT SERPL-MCNC: 6.7 G/DL (ref 6.4–8.2)
RBC # BLD AUTO: 4.42 X10*6/UL (ref 4–5.2)
SODIUM SERPL-SCNC: 140 MMOL/L (ref 136–145)
TRIGL SERPL-MCNC: 119 MG/DL (ref 0–149)
VLDL: 24 MG/DL (ref 0–40)
WBC # BLD AUTO: 6.5 X10*3/UL (ref 4.4–11.3)

## 2024-10-14 PROCEDURE — 80053 COMPREHEN METABOLIC PANEL: CPT

## 2024-10-14 PROCEDURE — 36415 COLL VENOUS BLD VENIPUNCTURE: CPT

## 2024-10-14 PROCEDURE — 80061 LIPID PANEL: CPT

## 2024-10-14 PROCEDURE — 85025 COMPLETE CBC W/AUTO DIFF WBC: CPT

## 2025-01-20 ENCOUNTER — APPOINTMENT (OUTPATIENT)
Dept: UROLOGY | Facility: CLINIC | Age: 60
End: 2025-01-20
Payer: COMMERCIAL

## 2025-01-21 ENCOUNTER — APPOINTMENT (OUTPATIENT)
Dept: UROLOGY | Facility: CLINIC | Age: 60
End: 2025-01-21
Payer: COMMERCIAL

## 2025-01-21 VITALS
BODY MASS INDEX: 28.66 KG/M2 | WEIGHT: 172 LBS | SYSTOLIC BLOOD PRESSURE: 123 MMHG | DIASTOLIC BLOOD PRESSURE: 83 MMHG | HEIGHT: 65 IN | RESPIRATION RATE: 18 BRPM

## 2025-01-21 DIAGNOSIS — N20.1 URETERAL CALCULUS: ICD-10-CM

## 2025-01-21 DIAGNOSIS — N20.0 CALCULUS OF KIDNEY: ICD-10-CM

## 2025-01-21 DIAGNOSIS — N39.0 URINARY TRACT INFECTION WITHOUT HEMATURIA, SITE UNSPECIFIED: ICD-10-CM

## 2025-01-21 DIAGNOSIS — N20.0 CALCULUS OF KIDNEY: Primary | ICD-10-CM

## 2025-01-21 DIAGNOSIS — N35.12 POSTINFECTIVE URETHRAL STRICTURE IN FEMALE: ICD-10-CM

## 2025-01-21 LAB
POC APPEARANCE, URINE: CLEAR
POC BILIRUBIN, URINE: NEGATIVE
POC BLOOD, URINE: ABNORMAL
POC COLOR, URINE: YELLOW
POC GLUCOSE, URINE: NEGATIVE MG/DL
POC KETONES, URINE: NEGATIVE MG/DL
POC LEUKOCYTES, URINE: NEGATIVE
POC NITRITE,URINE: NEGATIVE
POC PH, URINE: 6 PH
POC PROTEIN, URINE: NEGATIVE MG/DL
POC SPECIFIC GRAVITY, URINE: >=1.03
POC UROBILINOGEN, URINE: 0.2 EU/DL

## 2025-01-21 PROCEDURE — 81003 URINALYSIS AUTO W/O SCOPE: CPT | Performed by: UROLOGY

## 2025-01-21 PROCEDURE — 52281 CYSTOSCOPY AND TREATMENT: CPT | Performed by: UROLOGY

## 2025-01-21 NOTE — LETTER
January 21, 2025     Nader Colbert MD  [address] [address]    Patient: Jade Schwab   YOB: 1965   Date of Visit: 1/21/2025       Dear Dr. Nader Colbert MD:    Thank you for referring Jade Schwab to me for evaluation. Below are my notes for this consultation.  If you have questions, please do not hesitate to call me. I look forward to following your patient along with you.       Sincerely,     Oscar Eddy MD      CC: No Recipients  ______________________________________________________________________________________      Provider Impressions     59 year-old white female originally seen on 07/09/14. The patient passed 2 CALCULI, 0.7 and 0.9 cm. There was a question of a left STRICTURE on CAT scan. It recommended an intravenous pyelogram. CALCULI were sent for analysis. Patient is employed as a  at the [employer]. She has a positive family history of prostate cancer. She has a 10-pack-year cigarette smoking history.      CALCIUM OXALATE  stones     As part of her evaluation, a renal colic CAT scan was performed which identified a left URETERAL STRICTURE with poor emptying. Recommendation was for an intravenous pyelogram.      The patient returns today without any complaints. As stated, the stone analysis was CALCIUM OXALATE. The intravenous pyelogram only identified a 0.3 cm in diameter right RENAL CALCULUS. There was no evidence of obstruction or ureteral stricture     07/24/15, patient states that she is passed 2 STONES over the past year, one requiring an emergency room visit. She states that the STONES were bigger than the last time, which was 9 mm. Her most recent CAT scan shows several STONES in the left KIDNEY, the largest being 4 mm. Her urinalysis is negative.     7/22/2016â€“Established pt here today for f/u and and review of testing. Denies flank pain, dysuria, or hematuria.  Renal colic CTâ€“left 's renal CALCULI, at least 5 in  "number. Largest measuring 6 x 4 mm; urinalysis-negative     08/31/16, IVP with an addendum identifies multiple left RENAL CALCULI measuring 3 mm. The patient will return in 1 year.     06/14/17, patient has no new complaints. Urinalysis is negative. Renal colic CAT scan shows 5 STONES in the left kidney with the largest being 4 mm. She has no stones in the right kidney. She will return in 1 year. She was given an oxalate packet today.     07/23/18, patient has no new complaints. Urinalysis and urine culture are negative. Renal colic CAT scan shows 5 STONES. A 6 mm left upper pole STONE, a 2 mm left mid pole STONE and 3-4 mm STONES in the lower pole of the left kidney. She will return with a creatinine and IVP. We will discuss ESWL at that time.     08/20/18, IVP is completed and although there is some bowel gas obscuring, the upper pole large 8 mm STONE in the lower pole 5 mm STONES were seen again. The patient does wish to proceed with shockwave lithotripsy and will be scheduled for 10/11/18. Dr. Colbert will provide preoperative risk stratification.     10/15/18, OR, CYSTOSCOPY WITH URETHRAL DILATATION, LEFT RETROPYELOGRAM, PLACEMENT OF LEFT 6 [place]ian BY 24 CM DOUBLE-J URETERAL STENT, ESWL OF A 0.9, 0.8 AND 0.6 CM RENAL CALCULI.     11/12/18, patient calls complaining that she is \"miserable\" with her stent. She is still has residual stone burden and is scheduled for ESWL on 11/29/18. However, due to her discomfort with the stent, she will be returning for stent removal in office. She understands that she may take the chance of a stone fragment obstructing the ureter. She is well aware of the benefits and risks and wishes to proceed.     11/17/18, in office cystoscopy with urethral dilatation and removal of left ureteral stent and left ureteral calculus. She will return for ESWL of her remaining left renal calculi on 11/29/18. She will require urethral dilatations in the future.     11/29/18, OR, ESWL of a 0.6 cm " left renal calculus.     03/12/19, successful cystoscopy with urethral dilatation a 26 Micronesian with mild bleeding. Trigone is erythematous, the remaining lower third of the bladder is also inflamed. We will maintain a 3 month schedule.     06/11/19, successful cystoscopy with urethral dilatation to 26 Micronesian with moderate pain. Trigone is erythematous with glomerulations. She is complaining of right flank pain. We are not sure if this is secondary to a UTI or new stones. Therefore I will order a 5 day course of Cipro and also a renal colic CAT scan for further evaluation.     09/03/19, successful cystoscopy with urethral dilatation to 26 Micronesian with minimal pain. Trigone does have glomerulations and erythema. The remainder of the bladder is normal. No longer right flank pain. Positive UTI for Escherichia coli resistant to Cipro, allergic to Macrobid, therefore Keflex. Renal colic CAT scan shows a 2 mm stone in the left kidney. Flow rate of 12 with a PVR of 0. I will begin twice a week Keflex 500 mg. However we will expand to a 4 month schedule for the coming year.     01/06/20, successful cystoscopy with urethral dilatation a 26 Micronesian with minimal discomfort. Still some mild glomerulations and erythema in the trigone. The remainder of the bladder is normal. No further flank pain. Urine culture showed no growth. Previous Escherichia coli was resistant to Cipro, the patient is allergic to Macrobid. She has been taking Keflex twice a week but claims that there is an unpleasant odor with that medication in her urine. We will switch to Bactrim DS twice a week. Flow rate of 9 with a PVR of 0. We will maintain a 4 month schedule until July.     05/22/20, successful cystoscopy with urethral dilatation to 26 Micronesian with minimal discomfort. Only mild erythema in the trigone and no further glomerulations. Flow rate of 12 cc/s with a PVR of 0. She continues on Bactrim double strength twice a week. She will return in 4 months.      09/25/20, successful cystoscopy with urethral dilatation a 26 [place] with moderate pain. Moderate erythema in the trigone and base of the bladder. No glomerulations seen. Flow rate 22 cc/s with a PVR of 44 cc. She continues on Bactrim double strength twice a week. She was scheduled for her urine tests and a CAT scan but had delays. We will see her again in 4 months.     December 2020, patient tested Covid positive, no hospitalization.     March 2, 2021, successful cystoscopy with urethral dilatation to 26 [place] of a dense urethral stricture with moderate pain. Moderate erythema in the trigone and mild erythema in the base of the bladder. No further glomerulations seen. No flow rate today. PVR 14 cc. We will no longer order a flow rate and PVR. She continues on double strength Bactrim twice a week. Urinalysis and urine culture are negative. Renal colic CAT scan identifies 2 separate 2 mm nonobstructing stones in the left kidney. She is very happy with the regimen and feels she is completely emptying with dilations. She has not had a UTI in 2-1/2 years, previously every other month for a period of 3 years. She will return in 4 months as she requests.     July 26, 2021, successful cystoscopy with urethral dilatation a 26 [place] Korean of a dense urethral stricture with minimal pain and no bleeding. Still moderate erythema posteriorly and in the trigone. However no further glomerulations. She wishes to maintain a 4-month regimen. She has not had a UTI in over 3 years on Bactrim twice a week.     November 29, 2021, successful cystoscopy with dilatation to 26 [place] of an upward sloping dense urethral stricture with minimal pain and no bleeding. No further glomerulations. Erythema posteriorly and in the trigone. Both ureteral orifices were identified and producing urine. Patient wishes to maintain her 4-month schedule and is quite happy that it has been 3 full years without a UTI on Bactrim twice a week.     March 21, 2022,  successful cystoscopy with dilatation of an upward sloping dense urethral stricture to 26 Norwegian with minimal pain and no bleeding. No further glomerulations. Mild erythema posteriorly and moderate erythema in the trigone and bladder base. Both ureteral orifices were identified. Patient wishes to maintain her 4-month schedule and is very pleased that it is been for 4 years since her last UTI. Previously, every other month for a period of 18 months. She does continue on Bactrim DS twice a week.     July 19, 2022, successful cystoscopy with dilatation of an upward sloping dense urethral stricture to 26 Norwegian with minimal pain and no bleeding. Erythema is seen in the bladder base and trigone with a clear line of demarcation. No evidence of stones or tumors. Flow rate 22 cc/s, total volume 121 cc, PVR 0 cc. Patient has not had a UTI for over 4 years on her regimen. Previously every other month for period of 18 months. She continues on Bactrim DS twice a week. Urinalysis and urine culture were both negative. We will now expand to a 5-month regimen. Renal colic CAT scan identified 4 mm stones.     January 14, 2023, successful cystoscopy with dilatation of an upward sloping dense urethral stricture to 26 Norwegian with minimal discomfort. Erythema is seen in the bladder base and trigone once again. Both ureteral orifices were identified and producing urine. No evidence of stones or tumors. Today's flow rate 19 cc/s with a total volume of 88 cc, PVR 8 cc. Again, she has not had a UTI in over 5 years on this regimen. Previously she was experiencing a UTI every other month for a period of 18 months. She continues on Bactrim DS twice a week. We will maintain her 5-month regimen.     June 19, 2023, successful cystoscopy with dilatation of a dense upward sloping urethral stricture to 26 Norwegian with minimal pain. Erythema continues posteriorly. No tumors or stones. Both ureteral orifices were identified. Flow rate of 12 cc/s total  volume 52 cc, PVR 0 cc. She continues now with no UTI in over 5 years with this regimen. Previously a UTI every other month for 18 months. Urinalysis shows no blood. Urine culture no growth. Renal colic CAT scan identifies bilateral 3 mm calculi without obstruction. She continues on Bactrim DS twice a week we had a long discussion regarding expansion of her regimen. She has agreed to expand to 6 months but wishes to hold that pattern indefinitely and I have agreed.     February 26, 2024, successful cystoscopy with dilatation of an upward sloping dense urethral stricture to 26 [place] with minimal discomfort.  Once again, erythema is concentrated in the base.  No stones or tumors seen.  Flow rate 24 cc/s, total volume 117 cc, PVR 0 cc.  Now with no recurrent UTI over 6 years with this regimen.  Previously a UTI every other month for 18 months.  She continues on Bactrim DS twice a week.  She wishes to maintain her 6-month schedule indefinitely     July 26, 2024, successful cystoscopy with dilatation of a dense upward sloping urethral stricture to 26 [place] with minimal pain.  No bleeding.  Erythema concentrated posteriorly and in the base.  No stones or tumors identified.  PVR 0 cc.  She continues on Bactrim double strength twice a week.  Urinalysis shows no blood.  Urine culture no growth.  Renal colic CAT scan shows multiple, approximately 6, nonobstructing left renal calculi ranging from 2 to 4 mm.  She wishes to maintain her 6-month schedule indefinitely.    January 21, 2025, successful cystoscopy with dilatation of an upward sloping dense urethral stricture.  Dilated to 26 [place] with minimal pain and no bleeding.  Once again, erythema is only in the trigone which is significant improvement.  No stones or tumors within the bladder and a PVR of 0 cc.  She is maintained on Bactrim double strength twice a week.  She is extremely happy with her regimen, further incomplete emptying or recurrent UTIs.  She will maintain  her 6-month schedule as she wishes.     PLAN:     #1 The patient will return in January 2026 and July 2025 for cystoscopy with urethral dilatation to 26 Vietnamese, KEFLEX 250 mg every 12 hours 4 doses PROPHYLAXIS     #2 July of 2025 obtain a urine analysis, urine culture and renal colic CAT scan prior to that visit. Patient wishes to hold in 6 months indefinitely.     #3 continue Bactrim double strength 1 tablet by mouth every Wednesday and every Sunday     Physical Exam  Vitals and nursing note reviewed. Exam conducted with a chaperone present.   Constitutional:       Appearance: Normal appearance.   HENT:      Head: Normocephalic and atraumatic.   Pulmonary:      Effort: Pulmonary effort is normal.   Abdominal:      Palpations: Abdomen is soft.      Tenderness: There is no abdominal tenderness.   Genitourinary:     General: Normal vulva.      Vagina: No vaginal discharge.   Musculoskeletal:         General: Normal range of motion.      Cervical back: Normal range of motion and neck supple.   Neurological:      General: No focal deficit present.      Mental Status: She is alert and oriented to person, place, and time.   Psychiatric:         Mood and Affect: Mood normal.         Behavior: Behavior normal.        This note was created with voice-recognition software and was not corrected for typographical or grammatical errors.

## 2025-01-21 NOTE — PROGRESS NOTES
Provider Impressions     59 year-old white female originally seen on 07/09/14. The patient passed 2 CALCULI, 0.7 and 0.9 cm. There was a question of a left STRICTURE on CAT scan. It recommended an intravenous pyelogram. CALCULI were sent for analysis. Patient is employed as a  at the [employer]. She has a positive family history of prostate cancer. She has a 10-pack-year cigarette smoking history.      CALCIUM OXALATE  stones     As part of her evaluation, a renal colic CAT scan was performed which identified a left URETERAL STRICTURE with poor emptying. Recommendation was for an intravenous pyelogram.      The patient returns today without any complaints. As stated, the stone analysis was CALCIUM OXALATE. The intravenous pyelogram only identified a 0.3 cm in diameter right RENAL CALCULUS. There was no evidence of obstruction or ureteral stricture     07/24/15, patient states that she is passed 2 STONES over the past year, one requiring an emergency room visit. She states that the STONES were bigger than the last time, which was 9 mm. Her most recent CAT scan shows several STONES in the left KIDNEY, the largest being 4 mm. Her urinalysis is negative.     7/22/2016â€“Established pt here today for f/u and and review of testing. Denies flank pain, dysuria, or hematuria.  Renal colic CTâ€“left 's renal CALCULI, at least 5 in number. Largest measuring 6 x 4 mm; urinalysis-negative     08/31/16, IVP with an addendum identifies multiple left RENAL CALCULI measuring 3 mm. The patient will return in 1 year.     06/14/17, patient has no new complaints. Urinalysis is negative. Renal colic CAT scan shows 5 STONES in the left kidney with the largest being 4 mm. She has no stones in the right kidney. She will return in 1 year. She was given an oxalate packet today.     07/23/18, patient has no new complaints. Urinalysis and urine culture are negative. Renal colic CAT scan shows 5 STONES. A 6 mm left upper pole  "STONE, a 2 mm left mid pole STONE and 3-4 mm STONES in the lower pole of the left kidney. She will return with a creatinine and IVP. We will discuss ESWL at that time.     08/20/18, IVP is completed and although there is some bowel gas obscuring, the upper pole large 8 mm STONE in the lower pole 5 mm STONES were seen again. The patient does wish to proceed with shockwave lithotripsy and will be scheduled for 10/11/18. Dr. Colbert will provide preoperative risk stratification.     10/15/18, OR, CYSTOSCOPY WITH URETHRAL DILATATION, LEFT RETROPYELOGRAM, PLACEMENT OF LEFT 6 Chinese BY 24 CM DOUBLE-J URETERAL STENT, ESWL OF A 0.9, 0.8 AND 0.6 CM RENAL CALCULI.     11/12/18, patient calls complaining that she is \"miserable\" with her stent. She is still has residual stone burden and is scheduled for ESWL on 11/29/18. However, due to her discomfort with the stent, she will be returning for stent removal in office. She understands that she may take the chance of a stone fragment obstructing the ureter. She is well aware of the benefits and risks and wishes to proceed.     11/17/18, in office cystoscopy with urethral dilatation and removal of left ureteral stent and left ureteral calculus. She will return for ESWL of her remaining left renal calculi on 11/29/18. She will require urethral dilatations in the future.     11/29/18, OR, ESWL of a 0.6 cm left renal calculus.     03/12/19, successful cystoscopy with urethral dilatation a 26 Cuban with mild bleeding. Trigone is erythematous, the remaining lower third of the bladder is also inflamed. We will maintain a 3 month schedule.     06/11/19, successful cystoscopy with urethral dilatation to 26 Cuban with moderate pain. Trigone is erythematous with glomerulations. She is complaining of right flank pain. We are not sure if this is secondary to a UTI or new stones. Therefore I will order a 5 day course of Cipro and also a renal colic CAT scan for further evaluation.   "   09/03/19, successful cystoscopy with urethral dilatation to 26 Czech with minimal pain. Trigone does have glomerulations and erythema. The remainder of the bladder is normal. No longer right flank pain. Positive UTI for Escherichia coli resistant to Cipro, allergic to Macrobid, therefore Keflex. Renal colic CAT scan shows a 2 mm stone in the left kidney. Flow rate of 12 with a PVR of 0. I will begin twice a week Keflex 500 mg. However we will expand to a 4 month schedule for the coming year.     01/06/20, successful cystoscopy with urethral dilatation a 26 Czech with minimal discomfort. Still some mild glomerulations and erythema in the trigone. The remainder of the bladder is normal. No further flank pain. Urine culture showed no growth. Previous Escherichia coli was resistant to Cipro, the patient is allergic to Macrobid. She has been taking Keflex twice a week but claims that there is an unpleasant odor with that medication in her urine. We will switch to Bactrim DS twice a week. Flow rate of 9 with a PVR of 0. We will maintain a 4 month schedule until July.     05/22/20, successful cystoscopy with urethral dilatation to 26 Czech with minimal discomfort. Only mild erythema in the trigone and no further glomerulations. Flow rate of 12 cc/s with a PVR of 0. She continues on Bactrim double strength twice a week. She will return in 4 months.     09/25/20, successful cystoscopy with urethral dilatation a 26 Czech with moderate pain. Moderate erythema in the trigone and base of the bladder. No glomerulations seen. Flow rate 22 cc/s with a PVR of 44 cc. She continues on Bactrim double strength twice a week. She was scheduled for her urine tests and a CAT scan but had delays. We will see her again in 4 months.     December 2020, patient tested Covid positive, no hospitalization.     March 2, 2021, successful cystoscopy with urethral dilatation to 26 Czech of a dense urethral stricture with moderate pain. Moderate  erythema in the trigone and mild erythema in the base of the bladder. No further glomerulations seen. No flow rate today. PVR 14 cc. We will no longer order a flow rate and PVR. She continues on double strength Bactrim twice a week. Urinalysis and urine culture are negative. Renal colic CAT scan identifies 2 separate 2 mm nonobstructing stones in the left kidney. She is very happy with the regimen and feels she is completely emptying with dilations. She has not had a UTI in 2-1/2 years, previously every other month for a period of 3 years. She will return in 4 months as she requests.     July 26, 2021, successful cystoscopy with urethral dilatation a 26 Czech of a dense urethral stricture with minimal pain and no bleeding. Still moderate erythema posteriorly and in the trigone. However no further glomerulations. She wishes to maintain a 4-month regimen. She has not had a UTI in over 3 years on Bactrim twice a week.     November 29, 2021, successful cystoscopy with dilatation to 26 Czech of an upward sloping dense urethral stricture with minimal pain and no bleeding. No further glomerulations. Erythema posteriorly and in the trigone. Both ureteral orifices were identified and producing urine. Patient wishes to maintain her 4-month schedule and is quite happy that it has been 3 full years without a UTI on Bactrim twice a week.     March 21, 2022, successful cystoscopy with dilatation of an upward sloping dense urethral stricture to 26 Czech with minimal pain and no bleeding. No further glomerulations. Mild erythema posteriorly and moderate erythema in the trigone and bladder base. Both ureteral orifices were identified. Patient wishes to maintain her 4-month schedule and is very pleased that it is been for 4 years since her last UTI. Previously, every other month for a period of 18 months. She does continue on Bactrim DS twice a week.     July 19, 2022, successful cystoscopy with dilatation of an upward sloping  dense urethral stricture to 26 [place] with minimal pain and no bleeding. Erythema is seen in the bladder base and trigone with a clear line of demarcation. No evidence of stones or tumors. Flow rate 22 cc/s, total volume 121 cc, PVR 0 cc. Patient has not had a UTI for over 4 years on her regimen. Previously every other month for period of 18 months. She continues on Bactrim DS twice a week. Urinalysis and urine culture were both negative. We will now expand to a 5-month regimen. Renal colic CAT scan identified 4 mm stones.     January 14, 2023, successful cystoscopy with dilatation of an upward sloping dense urethral stricture to 26 Samoan with minimal discomfort. Erythema is seen in the bladder base and trigone once again. Both ureteral orifices were identified and producing urine. No evidence of stones or tumors. Today's flow rate 19 cc/s with a total volume of 88 cc, PVR 8 cc. Again, she has not had a UTI in over 5 years on this regimen. Previously she was experiencing a UTI every other month for a period of 18 months. She continues on Bactrim DS twice a week. We will maintain her 5-month regimen.     June 19, 2023, successful cystoscopy with dilatation of a dense upward sloping urethral stricture to 26 Samoan with minimal pain. Erythema continues posteriorly. No tumors or stones. Both ureteral orifices were identified. Flow rate of 12 cc/s total volume 52 cc, PVR 0 cc. She continues now with no UTI in over 5 years with this regimen. Previously a UTI every other month for 18 months. Urinalysis shows no blood. Urine culture no growth. Renal colic CAT scan identifies bilateral 3 mm calculi without obstruction. She continues on Bactrim DS twice a week we had a long discussion regarding expansion of her regimen. She has agreed to expand to 6 months but wishes to hold that pattern indefinitely and I have agreed.     February 26, 2024, successful cystoscopy with dilatation of an upward sloping dense urethral stricture  to 26 Citizen of [place] with minimal discomfort.  Once again, erythema is concentrated in the base.  No stones or tumors seen.  Flow rate 24 cc/s, total volume 117 cc, PVR 0 cc.  Now with no recurrent UTI over 6 years with this regimen.  Previously a UTI every other month for 18 months.  She continues on Bactrim DS twice a week.  She wishes to maintain her 6-month schedule indefinitely     July 26, 2024, successful cystoscopy with dilatation of a dense upward sloping urethral stricture to 26 Citizen of [place] with minimal pain.  No bleeding.  Erythema concentrated posteriorly and in the base.  No stones or tumors identified.  PVR 0 cc.  She continues on Bactrim double strength twice a week.  Urinalysis shows no blood.  Urine culture no growth.  Renal colic CAT scan shows multiple, approximately 6, nonobstructing left renal calculi ranging from 2 to 4 mm.  She wishes to maintain her 6-month schedule indefinitely.    January 21, 2025, successful cystoscopy with dilatation of an upward sloping dense urethral stricture.  Dilated to 26 Citizen of [place] with minimal pain and no bleeding.  Once again, erythema is only in the trigone which is significant improvement.  No stones or tumors within the bladder and a PVR of 0 cc.  She is maintained on Bactrim double strength twice a week.  She is extremely happy with her regimen, further incomplete emptying or recurrent UTIs.  She will maintain her 6-month schedule as she wishes.     PLAN:     #1 The patient will return in January 2026 and July 2025 for cystoscopy with urethral dilatation to 26 Citizen of [place], KEFLEX 250 mg every 12 hours 4 doses PROPHYLAXIS     #2 July of 2025 obtain a urine analysis, urine culture and renal colic CAT scan prior to that visit. Patient wishes to hold in 6 months indefinitely.     #3 continue Bactrim double strength 1 tablet by mouth every Wednesday and every Sunday     Physical Exam  Vitals and nursing note reviewed. Exam conducted with a chaperone present.   Constitutional:        Appearance: Normal appearance.   HENT:      Head: Normocephalic and atraumatic.   Pulmonary:      Effort: Pulmonary effort is normal.   Abdominal:      Palpations: Abdomen is soft.      Tenderness: There is no abdominal tenderness.   Genitourinary:     General: Normal vulva.      Vagina: No vaginal discharge.   Musculoskeletal:         General: Normal range of motion.      Cervical back: Normal range of motion and neck supple.   Neurological:      General: No focal deficit present.      Mental Status: She is alert and oriented to person, place, and time.   Psychiatric:         Mood and Affect: Mood normal.         Behavior: Behavior normal.        This note was created with voice-recognition software and was not corrected for typographical or grammatical errors.

## 2025-01-21 NOTE — PROGRESS NOTES
"Pt took Macrodantin 50mg as prescribed  Anesthesia: Local 2% Lidocaine  Instruments: 6F flexible disposable Cystoscope    Pt brought to procedure room and placed in dorsal lithotomy position. Pt draped and prepped in normal sterile fashion. 5ml lidocaine instilled into urethra (vagina). Pt tolerated well.    I was present as chaperone for the entirety of the procedure   Lyssa Harden  Cystoscopy performed by Dr. Oscar Eddy      Bedside \"Time Out\" Verification   Today's Date:  1/21/25. I attest that this time out verification took place prior to the procedure.   Procedure: Cysto   RN/LPN/MA:    Provider: [facility].   Verified By: MA, Lyssa Harden and Provider, Dr. Oscar Eddy.   Prior to the start of the procedure a time out was taken and the following were verified: the identity of the patient using two patient identifiers, the correct procedure, the correct site marked as indicated, the correct positioning for the patient and the correct equipment was obtained.   Cystoscopy - Jade Skender-identified using two (2) forms of identification.   Procedure: diagnostic 3:00pm     Time Completed: 3:22 PM  Indications for procedure: Cysto w/ Dilatation to 26F          Discussed with patient: Risks, benefits, and alternative were discussed in detail. Patient appears to understand and agrees to proceed. Patient has signed the procedure consent form.    CYSTOSCOPY:    Cystoscopy today reveals a dense upward sloping urethral stricture dilated to 26 Ecu[place] with minimal discomfort.  Once inside the bladder, both ureteral orifices were identified.  A full examination did not reveal any evidence of stones or tumors.  There is erythema in the trigone only which is a dramatic improvement.  PVR 0 cc.  "

## 2025-01-21 NOTE — PATIENT INSTRUCTIONS
Patient Discussion/Summary     It was very nice to see you again. You had a successful cystoscopy with urethral dilatation to 26 [place] today. Your bladder has improved with no further glomerulations. You had no residual urine today.  We will continue to follow.  You wish to maintain a 6-month schedule indefinitely. Continue taking Bactrim twice a week.       This note was created with voice-recognition software and was not corrected for typographical or grammatical errors

## 2025-06-23 ENCOUNTER — APPOINTMENT (OUTPATIENT)
Dept: RADIOLOGY | Facility: CLINIC | Age: 60
End: 2025-06-23
Payer: COMMERCIAL

## 2025-06-23 DIAGNOSIS — N20.1 URETERAL CALCULUS: ICD-10-CM

## 2025-06-23 DIAGNOSIS — N20.0 CALCULUS OF KIDNEY: ICD-10-CM

## 2025-06-23 PROCEDURE — 74176 CT ABD & PELVIS W/O CONTRAST: CPT | Performed by: STUDENT IN AN ORGANIZED HEALTH CARE EDUCATION/TRAINING PROGRAM

## 2025-06-23 PROCEDURE — 74176 CT ABD & PELVIS W/O CONTRAST: CPT

## 2025-06-24 LAB
APPEARANCE UR: CLEAR
BACTERIA UR CULT: NORMAL
BILIRUB UR QL STRIP: NEGATIVE
COLOR UR: YELLOW
GLUCOSE UR QL STRIP: NEGATIVE
HGB UR QL STRIP: NEGATIVE
KETONES UR QL STRIP: ABNORMAL
LEUKOCYTE ESTERASE UR QL STRIP: NEGATIVE
NITRITE UR QL STRIP: NEGATIVE
PH UR STRIP: 5.5 [PH] (ref 5–8)
PROT UR QL STRIP: NEGATIVE
SP GR UR STRIP: 1.02 (ref 1–1.03)

## 2025-07-07 ASSESSMENT — PROMIS GLOBAL HEALTH SCALE
RATE_AVERAGE_PAIN: 0
EMOTIONAL_PROBLEMS: NEVER
RATE_QUALITY_OF_LIFE: EXCELLENT
RATE_MENTAL_HEALTH: VERY GOOD
RATE_SOCIAL_SATISFACTION: EXCELLENT
RATE_GENERAL_HEALTH: EXCELLENT
RATE_PHYSICAL_HEALTH: VERY GOOD
CARRYOUT_PHYSICAL_ACTIVITIES: COMPLETELY
CARRYOUT_SOCIAL_ACTIVITIES: VERY GOOD

## 2025-07-09 NOTE — PROGRESS NOTES
"Subjective   Patient ID: Jade Schwab is a 59 y.o. female who presents for Annual Exam.  HPI  Wellness visit    In general healthy    Blood pressure controlled    She would like blood work    Stay current with mammogram and female health maintenance    She has a birthday coming up soon  Review of Systems   Constitutional:  Negative for activity change and fatigue.   HENT:  Negative for congestion and sore throat.    Eyes:  Negative for discharge.   Respiratory:  Negative for cough, chest tightness and shortness of breath.    Cardiovascular:  Negative for chest pain and leg swelling.   Gastrointestinal:  Negative for abdominal pain, blood in stool, constipation, diarrhea, nausea and vomiting.   Endocrine: Negative for cold intolerance and heat intolerance.   Genitourinary:  Negative for difficulty urinating and hematuria.   Musculoskeletal:  Negative for arthralgias, back pain, gait problem, myalgias and neck pain.   Allergic/Immunologic: Negative for environmental allergies.   Neurological:  Negative for dizziness, syncope, weakness, numbness and headaches.   Hematological:  Negative for adenopathy. Does not bruise/bleed easily.   Psychiatric/Behavioral:  Negative for dysphoric mood. The patient is not nervous/anxious.    All other systems reviewed and are negative.      Objective   /89 (BP Location: Left arm, BP Cuff Size: Large adult)   Pulse 85   Ht 1.651 m (5' 5\")   Wt 76.6 kg (168 lb 12.8 oz)   SpO2 97%   BMI 28.09 kg/m²    Physical Exam  Vitals and nursing note reviewed.   Constitutional:       General: She is not in acute distress.     Appearance: Normal appearance.   HENT:      Head: Normocephalic and atraumatic.      Right Ear: Tympanic membrane, ear canal and external ear normal.      Left Ear: Tympanic membrane, ear canal and external ear normal.      Nose: Nose normal.      Mouth/Throat:      Mouth: Mucous membranes are moist.      Pharynx: Oropharynx is clear. No oropharyngeal exudate or " posterior oropharyngeal erythema.   Eyes:      Extraocular Movements: Extraocular movements intact.      Conjunctiva/sclera: Conjunctivae normal.      Pupils: Pupils are equal, round, and reactive to light.   Cardiovascular:      Rate and Rhythm: Normal rate and regular rhythm.      Pulses: Normal pulses.      Heart sounds: Normal heart sounds. No murmur heard.  Pulmonary:      Effort: Pulmonary effort is normal. No respiratory distress.      Breath sounds: Normal breath sounds. No wheezing or rales.   Abdominal:      General: Abdomen is flat. Bowel sounds are normal. There is no distension.      Palpations: Abdomen is soft. There is no mass.      Tenderness: There is no abdominal tenderness.   Musculoskeletal:         General: No swelling or deformity. Normal range of motion.      Cervical back: Normal range of motion and neck supple.      Right lower leg: No edema.      Left lower leg: No edema.   Lymphadenopathy:      Cervical: No cervical adenopathy.   Skin:     General: Skin is warm and dry.      Capillary Refill: Capillary refill takes less than 2 seconds.      Findings: No lesion or rash.   Neurological:      General: No focal deficit present.      Mental Status: She is alert and oriented to person, place, and time.      Cranial Nerves: No cranial nerve deficit.      Motor: No weakness.   Psychiatric:         Mood and Affect: Mood normal.         Behavior: Behavior normal.         Thought Content: Thought content normal.         Judgment: Judgment normal.         Assessment/Plan   Problem List Items Addressed This Visit       Essential hypertension    Psoriasis     Other Visit Diagnoses         Routine general medical examination at a health care facility    -  Primary    Relevant Orders    Lipid Panel    CBC and Auto Differential    Comprehensive Metabolic Panel      Breast cancer screening by mammogram        Relevant Orders    BI mammo bilateral screening tomosynthesis            Patient education  provided.  Stay current with age appropriate health maintenance as instructed.  Appointment here or ER with new or worsening symptoms'  Keep appropriate follow-up visit.  Stay current with proper immunizations   Blood work as above  Mammogram as above  Stay current with wellness  Recheck 6 months and as needed

## 2025-07-10 ENCOUNTER — APPOINTMENT (OUTPATIENT)
Dept: PRIMARY CARE | Facility: CLINIC | Age: 60
End: 2025-07-10
Payer: COMMERCIAL

## 2025-07-10 VITALS
HEART RATE: 85 BPM | BODY MASS INDEX: 28.12 KG/M2 | WEIGHT: 168.8 LBS | DIASTOLIC BLOOD PRESSURE: 89 MMHG | OXYGEN SATURATION: 97 % | SYSTOLIC BLOOD PRESSURE: 122 MMHG | HEIGHT: 65 IN

## 2025-07-10 DIAGNOSIS — L40.9 PSORIASIS: ICD-10-CM

## 2025-07-10 DIAGNOSIS — Z00.00 ROUTINE GENERAL MEDICAL EXAMINATION AT A HEALTH CARE FACILITY: Primary | ICD-10-CM

## 2025-07-10 DIAGNOSIS — Z12.31 BREAST CANCER SCREENING BY MAMMOGRAM: ICD-10-CM

## 2025-07-10 DIAGNOSIS — I10 ESSENTIAL HYPERTENSION: ICD-10-CM

## 2025-07-10 PROCEDURE — 99396 PREV VISIT EST AGE 40-64: CPT | Performed by: FAMILY MEDICINE

## 2025-07-10 PROCEDURE — 3074F SYST BP LT 130 MM HG: CPT | Performed by: FAMILY MEDICINE

## 2025-07-10 PROCEDURE — 3079F DIAST BP 80-89 MM HG: CPT | Performed by: FAMILY MEDICINE

## 2025-07-10 PROCEDURE — 3008F BODY MASS INDEX DOCD: CPT | Performed by: FAMILY MEDICINE

## 2025-07-10 PROCEDURE — 1036F TOBACCO NON-USER: CPT | Performed by: FAMILY MEDICINE

## 2025-07-10 ASSESSMENT — ENCOUNTER SYMPTOMS
HEADACHES: 0
SHORTNESS OF BREATH: 0
CHEST TIGHTNESS: 0
WEAKNESS: 0
HEMATURIA: 0
NAUSEA: 0
DYSPHORIC MOOD: 0
VOMITING: 0
ADENOPATHY: 0
COUGH: 0
EYE DISCHARGE: 0
DIARRHEA: 0
SORE THROAT: 0
MYALGIAS: 0
DIFFICULTY URINATING: 0
NUMBNESS: 0
FATIGUE: 0
NECK PAIN: 0
ABDOMINAL PAIN: 0
ARTHRALGIAS: 0
NERVOUS/ANXIOUS: 0
DIZZINESS: 0
CONSTIPATION: 0
BACK PAIN: 0
BRUISES/BLEEDS EASILY: 0
ACTIVITY CHANGE: 0
BLOOD IN STOOL: 0

## 2025-07-14 ENCOUNTER — HOSPITAL ENCOUNTER (OUTPATIENT)
Dept: RADIOLOGY | Facility: CLINIC | Age: 60
Discharge: HOME | End: 2025-07-14
Payer: COMMERCIAL

## 2025-07-14 DIAGNOSIS — Z12.31 BREAST CANCER SCREENING BY MAMMOGRAM: ICD-10-CM

## 2025-07-14 PROCEDURE — 77067 SCR MAMMO BI INCL CAD: CPT

## 2025-07-14 PROCEDURE — 77063 BREAST TOMOSYNTHESIS BI: CPT | Performed by: RADIOLOGY

## 2025-07-14 PROCEDURE — 77067 SCR MAMMO BI INCL CAD: CPT | Performed by: RADIOLOGY

## 2025-07-14 ASSESSMENT — DERMATOLOGY QUALITY OF LIFE (QOL) ASSESSMENT
RATE HOW BOTHERED YOU ARE BY SYMPTOMS OF YOUR SKIN PROBLEM (EG, ITCHING, STINGING BURNING, HURTING OR SKIN IRRITATION): 1
RATE HOW EMOTIONALLY BOTHERED YOU ARE BY YOUR SKIN PROBLEM (FOR EXAMPLE, WORRY, EMBARRASSMENT, FRUSTRATION): 0 - NEVER BOTHERED
RATE HOW BOTHERED YOU ARE BY SYMPTOMS OF YOUR SKIN PROBLEM (EG, ITCHING, STINGING BURNING, HURTING OR SKIN IRRITATION): 1
RATE HOW BOTHERED YOU ARE BY EFFECTS OF YOUR SKIN PROBLEMS ON YOUR ACTIVITIES (EG, GOING OUT, ACCOMPLISHING WHAT YOU WANT, WORK ACTIVITIES OR YOUR RELATIONSHIPS WITH OTHERS): 0 - NEVER BOTHERED
RATE HOW EMOTIONALLY BOTHERED YOU ARE BY YOUR SKIN PROBLEM (FOR EXAMPLE, WORRY, EMBARRASSMENT, FRUSTRATION): 0 - NEVER BOTHERED
RATE HOW BOTHERED YOU ARE BY EFFECTS OF YOUR SKIN PROBLEMS ON YOUR ACTIVITIES (EG, GOING OUT, ACCOMPLISHING WHAT YOU WANT, WORK ACTIVITIES OR YOUR RELATIONSHIPS WITH OTHERS): 0 - NEVER BOTHERED

## 2025-07-14 ASSESSMENT — PATIENT GLOBAL ASSESSMENT (PGA): WHAT IS THE PGA: PATIENT GLOBAL ASSESSMENT:  1 - CLEAR

## 2025-07-15 LAB
ALBUMIN SERPL-MCNC: 4.3 G/DL (ref 3.6–5.1)
ALP SERPL-CCNC: 125 U/L (ref 37–153)
ALT SERPL-CCNC: 13 U/L (ref 6–29)
ANION GAP SERPL CALCULATED.4IONS-SCNC: 8 MMOL/L (CALC) (ref 7–17)
AST SERPL-CCNC: 16 U/L (ref 10–35)
BASOPHILS # BLD AUTO: 70 CELLS/UL (ref 0–200)
BASOPHILS NFR BLD AUTO: 1.2 %
BILIRUB SERPL-MCNC: 1 MG/DL (ref 0.2–1.2)
BUN SERPL-MCNC: 15 MG/DL (ref 7–25)
CALCIUM SERPL-MCNC: 9.3 MG/DL (ref 8.6–10.4)
CHLORIDE SERPL-SCNC: 106 MMOL/L (ref 98–110)
CHOLEST SERPL-MCNC: 176 MG/DL
CHOLEST/HDLC SERPL: 3.1 (CALC)
CO2 SERPL-SCNC: 25 MMOL/L (ref 20–32)
CREAT SERPL-MCNC: 0.99 MG/DL (ref 0.5–1.03)
EGFRCR SERPLBLD CKD-EPI 2021: 66 ML/MIN/1.73M2
EOSINOPHIL # BLD AUTO: 209 CELLS/UL (ref 15–500)
EOSINOPHIL NFR BLD AUTO: 3.6 %
ERYTHROCYTE [DISTWIDTH] IN BLOOD BY AUTOMATED COUNT: 12.8 % (ref 11–15)
GLUCOSE SERPL-MCNC: 97 MG/DL (ref 65–99)
HCT VFR BLD AUTO: 42.3 % (ref 35–45)
HDLC SERPL-MCNC: 56 MG/DL
HGB BLD-MCNC: 13.6 G/DL (ref 11.7–15.5)
LDLC SERPL CALC-MCNC: 96 MG/DL (CALC)
LYMPHOCYTES # BLD AUTO: 2523 CELLS/UL (ref 850–3900)
LYMPHOCYTES NFR BLD AUTO: 43.5 %
MCH RBC QN AUTO: 31.5 PG (ref 27–33)
MCHC RBC AUTO-ENTMCNC: 32.2 G/DL (ref 32–36)
MCV RBC AUTO: 97.9 FL (ref 80–100)
MONOCYTES # BLD AUTO: 464 CELLS/UL (ref 200–950)
MONOCYTES NFR BLD AUTO: 8 %
NEUTROPHILS # BLD AUTO: 2535 CELLS/UL (ref 1500–7800)
NEUTROPHILS NFR BLD AUTO: 43.7 %
NONHDLC SERPL-MCNC: 120 MG/DL (CALC)
PLATELET # BLD AUTO: 281 THOUSAND/UL (ref 140–400)
PMV BLD REES-ECKER: 11.4 FL (ref 7.5–12.5)
POTASSIUM SERPL-SCNC: 4.2 MMOL/L (ref 3.5–5.3)
PROT SERPL-MCNC: 6.7 G/DL (ref 6.1–8.1)
RBC # BLD AUTO: 4.32 MILLION/UL (ref 3.8–5.1)
SODIUM SERPL-SCNC: 139 MMOL/L (ref 135–146)
TRIGL SERPL-MCNC: 143 MG/DL
WBC # BLD AUTO: 5.8 THOUSAND/UL (ref 3.8–10.8)

## 2025-07-16 ENCOUNTER — APPOINTMENT (OUTPATIENT)
Dept: DERMATOLOGY | Facility: CLINIC | Age: 60
End: 2025-07-16
Payer: COMMERCIAL

## 2025-07-16 DIAGNOSIS — B07.9 VIRAL WARTS, UNSPECIFIED TYPE: ICD-10-CM

## 2025-07-16 DIAGNOSIS — D22.5 MELANOCYTIC NEVI OF TRUNK: ICD-10-CM

## 2025-07-16 DIAGNOSIS — L91.8 SKIN TAG: ICD-10-CM

## 2025-07-16 DIAGNOSIS — L81.4 LENTIGO: ICD-10-CM

## 2025-07-16 DIAGNOSIS — D18.01 HEMANGIOMA OF SKIN: ICD-10-CM

## 2025-07-16 DIAGNOSIS — Z12.83 ENCOUNTER FOR SCREENING FOR MALIGNANT NEOPLASM OF SKIN: ICD-10-CM

## 2025-07-16 DIAGNOSIS — L82.1 SEBORRHEIC KERATOSIS: ICD-10-CM

## 2025-07-16 DIAGNOSIS — R20.8 SKIN TENDERNESS: ICD-10-CM

## 2025-07-16 DIAGNOSIS — L57.8 PHOTOAGING OF SKIN: Primary | ICD-10-CM

## 2025-07-16 PROCEDURE — 17110 DESTRUCTION B9 LES UP TO 14: CPT | Performed by: DERMATOLOGY

## 2025-07-16 PROCEDURE — 99213 OFFICE O/P EST LOW 20 MIN: CPT | Performed by: DERMATOLOGY

## 2025-07-16 NOTE — Clinical Note
Warts are common growths that can appear anywhere on the body and are due to the HPV virus. There are many treatment options available, however if the lesions are asymptomatic they do not have to be treated.  Treatments include liquid nitrogen (LN&2), electrodesiccation & curettage (ED&C), laser, topical prescription or over-the counter products. Regardless of treatment chosen, warts often require multiple treatments and may recur after therapy.    Discussed risks and benefits of LN2, ED&C, candida injection, laser treatment, Over-the-counter treatments and observation.     Patient elected for LN2, The risks and benefits of LN2 were reviewed including incomplete removal, crusting, blister hypo and/or hyperpigmentation, scarring and recurrence.

## 2025-07-16 NOTE — Clinical Note
Benign growths that most commonly occur in areas of friction and rubbing, specifically the neck, axilla, and inguinal creases. No treatment is necessary. If lesions are symptomatic, they can be removed, however regardless of symptoms some insurances may not cover this procedure.    LN2 x 2 lesions as courtesy. The risks and benefits of LN2 were reviewed including incomplete removal, crusting, blister hypo and/or hyperpigmentation, scarring and recurrence.

## 2025-07-16 NOTE — PROGRESS NOTES
Subjective     Jade Schwab is a 60 y.o. female who presents for the following: Skin Check (Pt here for yearly FBSE. No hx of skin cancer. Lesion on back, different looking, and lesion on lefthand 3rd digit.).     Intake Questions  Do you have any new or changing Lesions?: (Patient-Rptd) (P) Yes  Where are these new or changing lesion(s) located?: (Patient-Rptd) (P) 1 back  For patients coming in for a Follow-up Visit:  Have there been any changes in your health since your last visit?: (Patient-Rptd) (P) none  Are you an organ transplant recipient?: (Patient-Rptd) (P) No  Have you had or do you have a Staph Infection?: (Patient-Rptd) (P) No  Have you had or do you have Vacular Disease?: (Patient-Rptd) (P) No  Do you use sunscreen?: (Patient-Rptd) (P) Occasionally  Do you use a tanning bed?: (Patient-Rptd) (P) Yes, Previously  Are you trying to get pregnant?: (Patient-Rptd) (P) No  Are you on birth control?: (Patient-Rptd) (P) No  Do you have irregular menstrual cycles?: (Patient-Rptd) (P) No    Review of Systems:  No other skin or systemic complaints other than what is documented elsewhere in the note.    The following portions of the chart were reviewed this encounter and updated as appropriate:         Skin Cancer History  Biopsy Log Book  No skin cancers from Specimen Tracking.    Additional History      Specialty Problems          Dermatology Problems    Intrinsic eczema    Psoriasis    Hemangioma of skin and subcutaneous tissue    Melanocytic nevi of trunk    Melanocytic nevus    Other benign neoplasm of skin of left lower limb, including hip    Other melanin hyperpigmentation    Other seborrheic keratosis    Other skin changes due to chronic exposure to nonionizing radiation    Pruritus        Objective   Well appearing patient in no apparent distress; mood and affect are within normal limits.    A full examination was performed including scalp, head, eyes, ears, nose, lips, neck, chest, axillae, abdomen,  back, buttocks, bilateral upper extremities, bilateral lower extremities, hands, feet, fingers, toes, fingernails, and toenails. All findings within normal limits unless otherwise noted below.    Assessment/Plan   Skin Exam  1. PHOTOAGING OF SKIN  Generalized  Mottled pigmentation with telangiectasias and brown reticular macules in sun exposed areas of the body.  The risk of chronic, cumulative sun damage and risk of development of skin cancer was reviewed today.   The importance of sun protection was reviewed: including the use of a broad spectrum sunscreen of at least SPF 30 that protects against both UVA/UVB rays, with ingredients such as Zinc oxide or titanium dioxide, wearing sun protective clothing and sun avoidance. We reviewed the warning signs of non-melanoma skin cancer and ABCDEs of melanoma  Please follow up should you notice any new or changing pre-existing skin lesion.  This Visit  - Follow Up In Dermatology - Established Patient  - Follow Up In Dermatology - Established Patient  2. HEMANGIOMA OF SKIN  Generalized  Cherry red papules  The benign nature of these skin lesions were reviewed, no treatment is necessary.   Please follow up for any new or pre-existing lesion that is changing in size, shape, color, becomes painful, tender, itches or bleed.  3. LENTIGO  Generalized  Scattered tan macules in sun-exposed areas.  These are benign skin lesions due to sun exposure. They will darken in response to sun exposure. They should be monitored for change in size, shape or color.  These lesions can be treated cosmetically with topical creams, liquid nitrogen and a variety of lasers.  4. SEBORRHEIC KERATOSIS (2)  Generalized, Left Lower Back  Brown, tan waxy macules and stuck on appearing papules and plaques  The benign nature of these skin lesions reviewed, reassure provided and no further treatment needed at this time.   These lesions can be removed, if symptomatic (itching, bleeding, rubbing on clothing,  painful), otherwise removal is considered cosmetic.     LN2 to lesion on the left back as courtesy. The risks and benefits of LN2 were reviewed including incomplete removal, crusting, blister hypo and/or hyperpigmentation, scarring and recurrence.  - Destr of lesion - Left Lower Back  Complexity: simple    Destruction method: cryotherapy    Lesion destroyed using liquid nitrogen: Yes    Cryotherapy cycles:  2  Outcome: patient tolerated procedure well with no complications    Post-procedure details: wound care instructions given      5. MELANOCYTIC NEVI OF TRUNK (4)  Left Breast, Left Flank, Left Upper Back, Right Buttock  Tan-brown symmetric macules and papules  Right buttock - pink dome shaped symmetric papule  Left flank symmetric red brown ~7mm macule  Clinically benign appearing nevi, no treatment is necessary.  The importance of sun protection was reviewed: including the use of a broad spectrum sunscreen that protects against both UVA/UVB rays, with ingredients such as Zinc oxide or titanium dioxide, wearing sun protective clothing and sun avoidance.   ABCDEs of melanoma reviewed.  Please follow up should you notice any new or changing pre-existing skin lesion.  6. VIRAL WARTS, UNSPECIFIED TYPE  Right Axilla  Filliform papule with erythematous base  Warts are common growths that can appear anywhere on the body and are due to the HPV virus. There are many treatment options available, however if the lesions are asymptomatic they do not have to be treated.  Treatments include liquid nitrogen (LN&2), electrodesiccation & curettage (ED&C), laser, topical prescription or over-the counter products. Regardless of treatment chosen, warts often require multiple treatments and may recur after therapy.    Discussed risks and benefits of LN2, ED&C, candida injection, laser treatment, Over-the-counter treatments and observation.     Patient elected for LN2, The risks and benefits of LN2 were reviewed including incomplete  removal, crusting, blister hypo and/or hyperpigmentation, scarring and recurrence.   - Destr of lesion - Right Axilla  Complexity: simple    Destruction method: cryotherapy    Informed consent: discussed and consent obtained    Lesion destroyed using liquid nitrogen: Yes    Cryotherapy cycles:  2  Outcome: patient tolerated procedure well with no complications    Post-procedure details: wound care instructions given      7. SKIN TAG (2)  Left Medial Thigh, Right Inframammary Fold  Fleshy, skin-colored sessile and pedunculated papules.   Benign growths that most commonly occur in areas of friction and rubbing, specifically the neck, axilla, and inguinal creases. No treatment is necessary. If lesions are symptomatic, they can be removed, however regardless of symptoms some insurances may not cover this procedure.    LN2 x 2 lesions as courtesy. The risks and benefits of LN2 were reviewed including incomplete removal, crusting, blister hypo and/or hyperpigmentation, scarring and recurrence.    8. ENCOUNTER FOR SCREENING FOR MALIGNANT NEOPLASM OF SKIN  Generalized  No suspicious lesions noted on examination today  The risk of chronic, cumulative sun damage and risk of development of skin cancer was reviewed today.   The importance of sun protection was reviewed: including the use of a broad spectrum sunscreen of at least SPF 30 that protects against both UVA/UVB rays, with ingredients such as Zinc oxide or titanium dioxide, wearing sun protective clothing and sun avoidance. We reviewed the warning signs of non-melanoma skin cancer and ABCDEs of melanoma  Please follow up should you notice any new or changing pre-existing skin lesion.  This Visit  - Follow Up In Dermatology - Established Patient

## 2025-07-16 NOTE — Clinical Note
The benign nature of these skin lesions reviewed, reassure provided and no further treatment needed at this time.   These lesions can be removed, if symptomatic (itching, bleeding, rubbing on clothing, painful), otherwise removal is considered cosmetic.     LN2 to lesion on the left back as courtesy. The risks and benefits of LN2 were reviewed including incomplete removal, crusting, blister hypo and/or hyperpigmentation, scarring and recurrence.

## 2025-07-16 NOTE — Clinical Note
Tan-brown symmetric macules and papules  Right buttock - pink dome shaped symmetric papule  Left flank symmetric red brown ~7mm macule

## 2025-07-21 ENCOUNTER — APPOINTMENT (OUTPATIENT)
Dept: UROLOGY | Facility: CLINIC | Age: 60
End: 2025-07-21
Payer: COMMERCIAL

## 2025-07-21 DIAGNOSIS — N35.12 POSTINFECTIVE URETHRAL STRICTURE IN FEMALE: Primary | ICD-10-CM

## 2025-07-21 DIAGNOSIS — N20.0 CALCULUS OF KIDNEY: ICD-10-CM

## 2025-07-21 DIAGNOSIS — R31.0 GROSS HEMATURIA: ICD-10-CM

## 2025-07-21 DIAGNOSIS — N39.0 RECURRENT UTI: ICD-10-CM

## 2025-07-21 PROCEDURE — 52281 CYSTOSCOPY AND TREATMENT: CPT | Performed by: UROLOGY

## 2025-07-21 PROCEDURE — 99214 OFFICE O/P EST MOD 30 MIN: CPT | Performed by: UROLOGY

## 2025-07-21 RX ORDER — SULFAMETHOXAZOLE AND TRIMETHOPRIM 800; 160 MG/1; MG/1
1 TABLET ORAL 2 TIMES WEEKLY
Qty: 30 TABLET | Refills: 3 | Status: SHIPPED | OUTPATIENT
Start: 2025-07-21

## 2025-07-21 NOTE — PROGRESS NOTES
Provider Impressions     60 year-old white female originally seen on 07/09/14. The patient passed 2 CALCULI, 0.7 and 0.9 cm. There was a question of a left STRICTURE on CAT scan. It recommended an intravenous pyelogram. CALCULI were sent for analysis. Patient is employed as a  at the [employer]. She has a positive family history of prostate cancer. She has a 10-pack-year cigarette smoking history.      CALCIUM OXALATE  stones     As part of her evaluation, a renal colic CAT scan was performed which identified a left URETERAL STRICTURE with poor emptying. Recommendation was for an intravenous pyelogram.      The patient returns today without any complaints. As stated, the stone analysis was CALCIUM OXALATE. The intravenous pyelogram only identified a 0.3 cm in diameter right RENAL CALCULUS. There was no evidence of obstruction or ureteral stricture     07/24/15, patient states that she is passed 2 STONES over the past year, one requiring an emergency room visit. She states that the STONES were bigger than the last time, which was 9 mm. Her most recent CAT scan shows several STONES in the left KIDNEY, the largest being 4 mm. Her urinalysis is negative.     7/22/2016â€“Established pt here today for f/u and and review of testing. Denies flank pain, dysuria, or hematuria.  Renal colic CTâ€“left 's renal CALCULI, at least 5 in number. Largest measuring 6 x 4 mm; urinalysis-negative     08/31/16, IVP with an addendum identifies multiple left RENAL CALCULI measuring 3 mm. The patient will return in 1 year.     06/14/17, patient has no new complaints. Urinalysis is negative. Renal colic CAT scan shows 5 STONES in the left kidney with the largest being 4 mm. She has no stones in the right kidney. She will return in 1 year. She was given an oxalate packet today.     07/23/18, patient has no new complaints. Urinalysis and urine culture are negative. Renal colic CAT scan shows 5 STONES. A 6 mm left upper pole  "STONE, a 2 mm left mid pole STONE and 3-4 mm STONES in the lower pole of the left kidney. She will return with a creatinine and IVP. We will discuss ESWL at that time.     08/20/18, IVP is completed and although there is some bowel gas obscuring, the upper pole large 8 mm STONE in the lower pole 5 mm STONES were seen again. The patient does wish to proceed with shockwave lithotripsy and will be scheduled for 10/11/18. Dr. Colbert will provide preoperative risk stratification.     10/15/18, OR, CYSTOSCOPY WITH URETHRAL DILATATION, LEFT RETROPYELOGRAM, PLACEMENT OF LEFT 6 Urdu BY 24 CM DOUBLE-J URETERAL STENT, ESWL OF A 0.9, 0.8 AND 0.6 CM RENAL CALCULI.     11/12/18, patient calls complaining that she is \"miserable\" with her stent. She is still has residual stone burden and is scheduled for ESWL on 11/29/18. However, due to her discomfort with the stent, she will be returning for stent removal in office. She understands that she may take the chance of a stone fragment obstructing the ureter. She is well aware of the benefits and risks and wishes to proceed.     11/17/18, in office cystoscopy with urethral dilatation and removal of left ureteral stent and left ureteral calculus. She will return for ESWL of her remaining left renal calculi on 11/29/18. She will require urethral dilatations in the future.     11/29/18, OR, ESWL of a 0.6 cm left renal calculus.     03/12/19, successful cystoscopy with urethral dilatation a 26 Dutch with mild bleeding. Trigone is erythematous, the remaining lower third of the bladder is also inflamed. We will maintain a 3 month schedule.     06/11/19, successful cystoscopy with urethral dilatation to 26 Dutch with moderate pain. Trigone is erythematous with glomerulations. She is complaining of right flank pain. We are not sure if this is secondary to a UTI or new stones. Therefore I will order a 5 day course of Cipro and also a renal colic CAT scan for further evaluation.   "   09/03/19, successful cystoscopy with urethral dilatation to 26 [place] with minimal pain. Trigone does have glomerulations and erythema. The remainder of the bladder is normal. No longer right flank pain. Positive UTI for Escherichia coli resistant to Cipro, allergic to Macrobid, therefore Keflex. Renal colic CAT scan shows a 2 mm stone in the left kidney. Flow rate of 12 with a PVR of 0. I will begin twice a week Keflex 500 mg. However we will expand to a 4 month schedule for the coming year.     01/06/20, successful cystoscopy with urethral dilatation a 26 [place]ian with minimal discomfort. Still some mild glomerulations and erythema in the trigone. The remainder of the bladder is normal. No further flank pain. Urine culture showed no growth. Previous Escherichia coli was resistant to Cipro, the patient is allergic to Macrobid. She has been taking Keflex twice a week but claims that there is an unpleasant odor with that medication in her urine. We will switch to Bactrim DS twice a week. Flow rate of 9 with a PVR of 0. We will maintain a 4 month schedule until July.     05/22/20, successful cystoscopy with urethral dilatation to 26 [place] with minimal discomfort. Only mild erythema in the trigone and no further glomerulations. Flow rate of 12 cc/s with a PVR of 0. She continues on Bactrim double strength twice a week. She will return in 4 months.     09/25/20, successful cystoscopy with urethral dilatation a 26 [place]itanian with moderate pain. Moderate erythema in the trigone and base of the bladder. No glomerulations seen. Flow rate 22 cc/s with a PVR of 44 cc. She continues on Bactrim double strength twice a week. She was scheduled for her urine tests and a CAT scan but had delays. We will see her again in 4 months.     December 2020, patient tested Covid positive, no hospitalization.     March 2, 2021, successful cystoscopy with urethral dilatation to 26 [place]ian of a dense urethral stricture with moderate pain. Moderate  erythema in the trigone and mild erythema in the base of the bladder. No further glomerulations seen. No flow rate today. PVR 14 cc. We will no longer order a flow rate and PVR. She continues on double strength Bactrim twice a week. Urinalysis and urine culture are negative. Renal colic CAT scan identifies 2 separate 2 mm nonobstructing stones in the left kidney. She is very happy with the regimen and feels she is completely emptying with dilations. She has not had a UTI in 2-1/2 years, previously every other month for a period of 3 years. She will return in 4 months as she requests.     July 26, 2021, successful cystoscopy with urethral dilatation a 26 [place] of a dense urethral stricture with minimal pain and no bleeding. Still moderate erythema posteriorly and in the trigone. However no further glomerulations. She wishes to maintain a 4-month regimen. She has not had a UTI in over 3 years on Bactrim twice a week.     November 29, 2021, successful cystoscopy with dilatation to 26 [place] of an upward sloping dense urethral stricture with minimal pain and no bleeding. No further glomerulations. Erythema posteriorly and in the trigone. Both ureteral orifices were identified and producing urine. Patient wishes to maintain her 4-month schedule and is quite happy that it has been 3 full years without a UTI on Bactrim twice a week.     March 21, 2022, successful cystoscopy with dilatation of an upward sloping dense urethral stricture to 26 [place] with minimal pain and no bleeding. No further glomerulations. Mild erythema posteriorly and moderate erythema in the trigone and bladder base. Both ureteral orifices were identified. Patient wishes to maintain her 4-month schedule and is very pleased that it is been for 4 years since her last UTI. Previously, every other month for a period of 18 months. She does continue on Bactrim DS twice a week.     July 19, 2022, successful cystoscopy with dilatation of an upward sloping  dense urethral stricture to 26 Mongolian with minimal pain and no bleeding. Erythema is seen in the bladder base and trigone with a clear line of demarcation. No evidence of stones or tumors. Flow rate 22 cc/s, total volume 121 cc, PVR 0 cc. Patient has not had a UTI for over 4 years on her regimen. Previously every other month for period of 18 months. She continues on Bactrim DS twice a week. Urinalysis and urine culture were both negative. We will now expand to a 5-month regimen. Renal colic CAT scan identified 4 mm stones.     January 14, 2023, successful cystoscopy with dilatation of an upward sloping dense urethral stricture to 26 Mongolian with minimal discomfort. Erythema is seen in the bladder base and trigone once again. Both ureteral orifices were identified and producing urine. No evidence of stones or tumors. Today's flow rate 19 cc/s with a total volume of 88 cc, PVR 8 cc. Again, she has not had a UTI in over 5 years on this regimen. Previously she was experiencing a UTI every other month for a period of 18 months. She continues on Bactrim DS twice a week. We will maintain her 5-month regimen.     June 19, 2023, successful cystoscopy with dilatation of a dense upward sloping urethral stricture to 26 Mongolian with minimal pain. Erythema continues posteriorly. No tumors or stones. Both ureteral orifices were identified. Flow rate of 12 cc/s total volume 52 cc, PVR 0 cc. She continues now with no UTI in over 5 years with this regimen. Previously a UTI every other month for 18 months. Urinalysis shows no blood. Urine culture no growth. Renal colic CAT scan identifies bilateral 3 mm calculi without obstruction. She continues on Bactrim DS twice a week we had a long discussion regarding expansion of her regimen. She has agreed to expand to 6 months but wishes to hold that pattern indefinitely and I have agreed.     February 26, 2024, successful cystoscopy with dilatation of an upward sloping dense urethral stricture  to 26 Angolan with minimal discomfort.  Once again, erythema is concentrated in the base.  No stones or tumors seen.  Flow rate 24 cc/s, total volume 117 cc, PVR 0 cc.  Now with no recurrent UTI over 6 years with this regimen.  Previously a UTI every other month for 18 months.  She continues on Bactrim DS twice a week.  She wishes to maintain her 6-month schedule indefinitely     July 26, 2024, successful cystoscopy with dilatation of a dense upward sloping urethral stricture to 26 Angolan with minimal pain.  No bleeding.  Erythema concentrated posteriorly and in the base.  No stones or tumors identified.  PVR 0 cc.  She continues on Bactrim double strength twice a week.  Urinalysis shows no blood.  Urine culture no growth.  Renal colic CAT scan shows multiple, approximately 6, nonobstructing left renal calculi ranging from 2 to 4 mm.  She wishes to maintain her 6-month schedule indefinitely.     January 21, 2025, successful cystoscopy with dilatation of an upward sloping dense urethral stricture.  Dilated to 26 Angolan with minimal pain and no bleeding.  Once again, erythema is only in the trigone which is significant improvement.  No stones or tumors within the bladder and a PVR of 0 cc.  She is maintained on Bactrim double strength twice a week.  She is extremely happy with her regimen, further incomplete emptying or recurrent UTIs.  She will maintain her 6-month schedule as she wishes.    July 21, 2025, successful cystoscopy with dilatation of an upward sloping dense urethral stricture to 26 Angolan.  No pain and no bleeding.  Erythema once again is seen posteriorly.  No evidence of stones or tumors within the bladder.  She is maintained on double strength Bactrim twice a week.  She is very happy with this regimen and wishes to maintain a 6-month schedule.  Urinalysis was negative for blood.  Urine culture no growth.  Renal colic CAT scan shows stable multiple left-sided subcentimeter nonobstructive nephrolithiasis.   I contacted the radiologist, Dr. Kedar mcintyre and asked for more specific information.  He stated that the stones measured up to 3 mm.  She will maintain her 6-month schedule as she wishes.     PLAN:     #1 The patient will return in January 2026 and July 2026 for cystoscopy with urethral dilatation to 26 [place], KEFLEX 250 mg every 12 hours 4 doses PROPHYLAXIS     #2 July of 2026 obtain a urine analysis, urine culture and renal colic CAT scan prior to that visit. Patient wishes to hold in 6 months indefinitely.     #3 continue Bactrim double strength 1 tablet by mouth every Wednesday and every Sunday     Physical Exam  Vitals and nursing note reviewed. Exam conducted with a chaperone present.   Constitutional:       Appearance: Normal appearance.   HENT:      Head: Normocephalic and atraumatic.   Pulmonary:      Effort: Pulmonary effort is normal.   Abdominal:      Palpations: Abdomen is soft.      Tenderness: There is no abdominal tenderness.   Genitourinary:     General: Normal vulva.      Vagina: No vaginal discharge.   Musculoskeletal:         General: Normal range of motion.      Cervical back: Normal range of motion and neck supple.   Neurological:      General: No focal deficit present.      Mental Status: She is alert and oriented to person, place, and time.   Psychiatric:         Mood and Affect: Mood normal.         Behavior: Behavior normal.        This note was created with voice-recognition software and was not corrected for typographical or grammatical errors.

## 2025-07-21 NOTE — PROGRESS NOTES
"Patient ID: Jade Schwab is a 60 y.o. female.  Pt denies any pain today. States has not had any recent hospital admits or surgeries since their last visit. Denies any concerns about falling or safety. SAMINA  Pt continues to take twice weekly Bactrim DS     Procedures  Pt took Keflex 250mg as prescribed  Anesthesia: Local 2% Lidocaine  Instruments: 6F flexible disposable Cystoscope     Pt brought to procedure room and placed in dorsal lithotomy position. Pt draped and prepped in normal sterile fashion. 5ml lidocaine instilled into urethra (vagina). Pt tolerated well.     I was present as chaperone for the entirety of the procedure     Cystoscopy performed by Dr. Oscar Eddy        Bedside \"Time Out\" Verification   Today's Date:  07/21/2025. I attest that this time out verification took place prior to the procedure.   Procedure: Cysto   RN/LPN/MA:    Provider: [facility].   Verified By: SAMINA MANZANO and Provider, Dr. Oscar Eddy.   Prior to the start of the procedure a time out was taken and the following were verified: the identity of the patient using two patient identifiers, the correct procedure, the correct site marked as indicated, the correct positioning for the patient and the correct equipment was obtained.   Cystoscopy - Jade Schwab-identified using two (2) forms of identification.   Procedure: diagnostic 3:00pm     Time Completed: 3:09 PM  Indications for procedure: Cysto w/ Dilatation to 26F           Discussed with patient: Risks, benefits, and alternative were discussed in detail. Patient appears to understand and agrees to proceed. Patient has signed the procedure consent form.    CYSTOSCOPY:    Cystoscopy today reveals a dense upward sloping urethral stricture dilated to 26 Cypriot with minimal discomfort and no bleeding.  No evidence of stones or tumors within the bladder.  Posterior erythema.  "

## 2025-07-21 NOTE — PATIENT INSTRUCTIONS
Patient Discussion/Summary     It was very nice to see you again. You had a successful cystoscopy with urethral dilatation to 26 Lao today. Your bladder has improved with no further glomerulations.  Urinalysis shows no blood and urine culture no growth.  Your CAT scan shows multiple stones in the left kidney with none of them larger than 3 mm.  We will continue to follow.  You wish to maintain a 6-month schedule indefinitely. Continue taking Bactrim twice a week.       This note was created with voice-recognition software and was not corrected for typographical or grammatical errors

## 2025-07-21 NOTE — LETTER
July 21, 2025     Nader Colbert MD  [address] [address]    Patient: Jade Schwab   YOB: 1965   Date of Visit: 7/21/2025       Dear Dr. Nader Colbert MD:    Thank you for referring Jade Schwab to me for evaluation. Below are my notes for this consultation.  If you have questions, please do not hesitate to call me. I look forward to following your patient along with you.       Sincerely,     Oscar Eddy MD      CC: No Recipients  ______________________________________________________________________________________    Provider Impressions     60 year-old white female originally seen on 07/09/14. The patient passed 2 CALCULI, 0.7 and 0.9 cm. There was a question of a left STRICTURE on CAT scan. It recommended an intravenous pyelogram. CALCULI were sent for analysis. Patient is employed as a  at the [employer]. She has a positive family history of prostate cancer. She has a 10-pack-year cigarette smoking history.      CALCIUM OXALATE  stones     As part of her evaluation, a renal colic CAT scan was performed which identified a left URETERAL STRICTURE with poor emptying. Recommendation was for an intravenous pyelogram.      The patient returns today without any complaints. As stated, the stone analysis was CALCIUM OXALATE. The intravenous pyelogram only identified a 0.3 cm in diameter right RENAL CALCULUS. There was no evidence of obstruction or ureteral stricture     07/24/15, patient states that she is passed 2 STONES over the past year, one requiring an emergency room visit. She states that the STONES were bigger than the last time, which was 9 mm. Her most recent CAT scan shows several STONES in the left KIDNEY, the largest being 4 mm. Her urinalysis is negative.     7/22/2016â€“Established pt here today for f/u and and review of testing. Denies flank pain, dysuria, or hematuria.  Renal colic CTâ€“left 's renal CALCULI, at least 5 in  "number. Largest measuring 6 x 4 mm; urinalysis-negative     08/31/16, IVP with an addendum identifies multiple left RENAL CALCULI measuring 3 mm. The patient will return in 1 year.     06/14/17, patient has no new complaints. Urinalysis is negative. Renal colic CAT scan shows 5 STONES in the left kidney with the largest being 4 mm. She has no stones in the right kidney. She will return in 1 year. She was given an oxalate packet today.     07/23/18, patient has no new complaints. Urinalysis and urine culture are negative. Renal colic CAT scan shows 5 STONES. A 6 mm left upper pole STONE, a 2 mm left mid pole STONE and 3-4 mm STONES in the lower pole of the left kidney. She will return with a creatinine and IVP. We will discuss ESWL at that time.     08/20/18, IVP is completed and although there is some bowel gas obscuring, the upper pole large 8 mm STONE in the lower pole 5 mm STONES were seen again. The patient does wish to proceed with shockwave lithotripsy and will be scheduled for 10/11/18. Dr. Colbert will provide preoperative risk stratification.     10/15/18, OR, CYSTOSCOPY WITH URETHRAL DILATATION, LEFT RETROPYELOGRAM, PLACEMENT OF LEFT 6 [place]ar[place]uan BY 24 CM DOUBLE-J URETERAL STENT, ESWL OF A 0.9, 0.8 AND 0.6 CM RENAL CALCULI.     11/12/18, patient calls complaining that she is \"miserable\" with her stent. She is still has residual stone burden and is scheduled for ESWL on 11/29/18. However, due to her discomfort with the stent, she will be returning for stent removal in office. She understands that she may take the chance of a stone fragment obstructing the ureter. She is well aware of the benefits and risks and wishes to proceed.     11/17/18, in office cystoscopy with urethral dilatation and removal of left ureteral stent and left ureteral calculus. She will return for ESWL of her remaining left renal calculi on 11/29/18. She will require urethral dilatations in the future.     11/29/18, OR, ESWL of a 0.6 cm " left renal calculus.     03/12/19, successful cystoscopy with urethral dilatation a 26 Thai with mild bleeding. Trigone is erythematous, the remaining lower third of the bladder is also inflamed. We will maintain a 3 month schedule.     06/11/19, successful cystoscopy with urethral dilatation to 26 Thai with moderate pain. Trigone is erythematous with glomerulations. She is complaining of right flank pain. We are not sure if this is secondary to a UTI or new stones. Therefore I will order a 5 day course of Cipro and also a renal colic CAT scan for further evaluation.     09/03/19, successful cystoscopy with urethral dilatation to 26 Thai with minimal pain. Trigone does have glomerulations and erythema. The remainder of the bladder is normal. No longer right flank pain. Positive UTI for Escherichia coli resistant to Cipro, allergic to Macrobid, therefore Keflex. Renal colic CAT scan shows a 2 mm stone in the left kidney. Flow rate of 12 with a PVR of 0. I will begin twice a week Keflex 500 mg. However we will expand to a 4 month schedule for the coming year.     01/06/20, successful cystoscopy with urethral dilatation a 26 Thai with minimal discomfort. Still some mild glomerulations and erythema in the trigone. The remainder of the bladder is normal. No further flank pain. Urine culture showed no growth. Previous Escherichia coli was resistant to Cipro, the patient is allergic to Macrobid. She has been taking Keflex twice a week but claims that there is an unpleasant odor with that medication in her urine. We will switch to Bactrim DS twice a week. Flow rate of 9 with a PVR of 0. We will maintain a 4 month schedule until July.     05/22/20, successful cystoscopy with urethral dilatation to 26 Thai with minimal discomfort. Only mild erythema in the trigone and no further glomerulations. Flow rate of 12 cc/s with a PVR of 0. She continues on Bactrim double strength twice a week. She will return in 4 months.      09/25/20, successful cystoscopy with urethral dilatation a 26 Syrian with moderate pain. Moderate erythema in the trigone and base of the bladder. No glomerulations seen. Flow rate 22 cc/s with a PVR of 44 cc. She continues on Bactrim double strength twice a week. She was scheduled for her urine tests and a CAT scan but had delays. We will see her again in 4 months.     December 2020, patient tested Covid positive, no hospitalization.     March 2, 2021, successful cystoscopy with urethral dilatation to 26 Syrian of a dense urethral stricture with moderate pain. Moderate erythema in the trigone and mild erythema in the base of the bladder. No further glomerulations seen. No flow rate today. PVR 14 cc. We will no longer order a flow rate and PVR. She continues on double strength Bactrim twice a week. Urinalysis and urine culture are negative. Renal colic CAT scan identifies 2 separate 2 mm nonobstructing stones in the left kidney. She is very happy with the regimen and feels she is completely emptying with dilations. She has not had a UTI in 2-1/2 years, previously every other month for a period of 3 years. She will return in 4 months as she requests.     July 26, 2021, successful cystoscopy with urethral dilatation a 26 Syrian of a dense urethral stricture with minimal pain and no bleeding. Still moderate erythema posteriorly and in the trigone. However no further glomerulations. She wishes to maintain a 4-month regimen. She has not had a UTI in over 3 years on Bactrim twice a week.     November 29, 2021, successful cystoscopy with dilatation to 26 Syrian of an upward sloping dense urethral stricture with minimal pain and no bleeding. No further glomerulations. Erythema posteriorly and in the trigone. Both ureteral orifices were identified and producing urine. Patient wishes to maintain her 4-month schedule and is quite happy that it has been 3 full years without a UTI on Bactrim twice a week.     March 21, 2022,  successful cystoscopy with dilatation of an upward sloping dense urethral stricture to 26 Syrian with minimal pain and no bleeding. No further glomerulations. Mild erythema posteriorly and moderate erythema in the trigone and bladder base. Both ureteral orifices were identified. Patient wishes to maintain her 4-month schedule and is very pleased that it is been for 4 years since her last UTI. Previously, every other month for a period of 18 months. She does continue on Bactrim DS twice a week.     July 19, 2022, successful cystoscopy with dilatation of an upward sloping dense urethral stricture to 26 Syrian with minimal pain and no bleeding. Erythema is seen in the bladder base and trigone with a clear line of demarcation. No evidence of stones or tumors. Flow rate 22 cc/s, total volume 121 cc, PVR 0 cc. Patient has not had a UTI for over 4 years on her regimen. Previously every other month for period of 18 months. She continues on Bactrim DS twice a week. Urinalysis and urine culture were both negative. We will now expand to a 5-month regimen. Renal colic CAT scan identified 4 mm stones.     January 14, 2023, successful cystoscopy with dilatation of an upward sloping dense urethral stricture to 26 Syrian with minimal discomfort. Erythema is seen in the bladder base and trigone once again. Both ureteral orifices were identified and producing urine. No evidence of stones or tumors. Today's flow rate 19 cc/s with a total volume of 88 cc, PVR 8 cc. Again, she has not had a UTI in over 5 years on this regimen. Previously she was experiencing a UTI every other month for a period of 18 months. She continues on Bactrim DS twice a week. We will maintain her 5-month regimen.     June 19, 2023, successful cystoscopy with dilatation of a dense upward sloping urethral stricture to 26 Syrian with minimal pain. Erythema continues posteriorly. No tumors or stones. Both ureteral orifices were identified. Flow rate of 12 cc/s total  volume 52 cc, PVR 0 cc. She continues now with no UTI in over 5 years with this regimen. Previously a UTI every other month for 18 months. Urinalysis shows no blood. Urine culture no growth. Renal colic CAT scan identifies bilateral 3 mm calculi without obstruction. She continues on Bactrim DS twice a week we had a long discussion regarding expansion of her regimen. She has agreed to expand to 6 months but wishes to hold that pattern indefinitely and I have agreed.     February 26, 2024, successful cystoscopy with dilatation of an upward sloping dense urethral stricture to 26 Welsh with minimal discomfort.  Once again, erythema is concentrated in the base.  No stones or tumors seen.  Flow rate 24 cc/s, total volume 117 cc, PVR 0 cc.  Now with no recurrent UTI over 6 years with this regimen.  Previously a UTI every other month for 18 months.  She continues on Bactrim DS twice a week.  She wishes to maintain her 6-month schedule indefinitely     July 26, 2024, successful cystoscopy with dilatation of a dense upward sloping urethral stricture to 26 Welsh with minimal pain.  No bleeding.  Erythema concentrated posteriorly and in the base.  No stones or tumors identified.  PVR 0 cc.  She continues on Bactrim double strength twice a week.  Urinalysis shows no blood.  Urine culture no growth.  Renal colic CAT scan shows multiple, approximately 6, nonobstructing left renal calculi ranging from 2 to 4 mm.  She wishes to maintain her 6-month schedule indefinitely.     January 21, 2025, successful cystoscopy with dilatation of an upward sloping dense urethral stricture.  Dilated to 26 Welsh with minimal pain and no bleeding.  Once again, erythema is only in the trigone which is significant improvement.  No stones or tumors within the bladder and a PVR of 0 cc.  She is maintained on Bactrim double strength twice a week.  She is extremely happy with her regimen, further incomplete emptying or recurrent UTIs.  She will  maintain her 6-month schedule as she wishes.    July 21, 2025, successful cystoscopy with dilatation of an upward sloping dense urethral stricture to 26 Indonesian.  No pain and no bleeding.  Erythema once again is seen posteriorly.  No evidence of stones or tumors within the bladder.  She is maintained on double strength Bactrim twice a week.  She is very happy with this regimen and wishes to maintain a 6-month schedule.  Urinalysis was negative for blood.  Urine culture no growth.  Renal colic CAT scan shows stable multiple left-sided subcentimeter nonobstructive nephrolithiasis.  I contacted the radiologist, Dr. Kedar mcintyre and asked for more specific information.  He stated that the stones measured up to 3 mm.  She will maintain her 6-month schedule as she wishes.     PLAN:     #1 The patient will return in January 2026 and July 2026 for cystoscopy with urethral dilatation to 26 Indonesian, KEFLEX 250 mg every 12 hours 4 doses PROPHYLAXIS     #2 July of 2026 obtain a urine analysis, urine culture and renal colic CAT scan prior to that visit. Patient wishes to hold in 6 months indefinitely.     #3 continue Bactrim double strength 1 tablet by mouth every Wednesday and every Sunday     Physical Exam  Vitals and nursing note reviewed. Exam conducted with a chaperone present.   Constitutional:       Appearance: Normal appearance.   HENT:      Head: Normocephalic and atraumatic.   Pulmonary:      Effort: Pulmonary effort is normal.   Abdominal:      Palpations: Abdomen is soft.      Tenderness: There is no abdominal tenderness.   Genitourinary:     General: Normal vulva.      Vagina: No vaginal discharge.   Musculoskeletal:         General: Normal range of motion.      Cervical back: Normal range of motion and neck supple.   Neurological:      General: No focal deficit present.      Mental Status: She is alert and oriented to person, place, and time.   Psychiatric:         Mood and Affect: Mood normal.         Behavior:  "Behavior normal.        This note was created with voice-recognition software and was not corrected for typographical or grammatical errors.     Patient ID: Jade Schwab is a 60 y.o. female.  Pt denies any pain today. States has not had any recent hospital admits or surgeries since their last visit. Denies any concerns about falling or safety. JML  Pt continues to take twice weekly Bactrim DS     Procedures  Pt took Keflex 250mg as prescribed  Anesthesia: Local 2% Lidocaine  Instruments: 6F flexible disposable Cystoscope     Pt brought to procedure room and placed in dorsal lithotomy position. Pt draped and prepped in normal sterile fashion. 5ml lidocaine instilled into urethra (vagina). Pt tolerated well.     I was present as chaperone for the entirety of the procedure     Cystoscopy performed by Dr. Oscar Eddy        Bedside \"Time Out\" Verification   Today's Date:  07/21/2025. I attest that this time out verification took place prior to the procedure.   Procedure: Cysto   RN/LPN/MA:    Provider: [facility].   Verified By: SAMINA MANZANO and Provider, Dr. Oscar Eddy.   Prior to the start of the procedure a time out was taken and the following were verified: the identity of the patient using two patient identifiers, the correct procedure, the correct site marked as indicated, the correct positioning for the patient and the correct equipment was obtained.   Cystoscopy - Jade Schwab-identified using two (2) forms of identification.   Procedure: diagnostic 3:00pm     Time Completed: 3:09 PM  Indications for procedure: Cysto w/ Dilatation to 26F           Discussed with patient: Risks, benefits, and alternative were discussed in detail. Patient appears to understand and agrees to proceed. Patient has signed the procedure consent form.    CYSTOSCOPY:    Cystoscopy today reveals a dense upward sloping urethral stricture dilated to 26 Syrian with minimal discomfort and no bleeding.  No evidence of stones or tumors " within the bladder.  Posterior erythema.

## 2025-09-16 ENCOUNTER — APPOINTMENT (OUTPATIENT)
Dept: OBSTETRICS AND GYNECOLOGY | Facility: CLINIC | Age: 60
End: 2025-09-16
Payer: COMMERCIAL

## 2026-01-21 ENCOUNTER — APPOINTMENT (OUTPATIENT)
Dept: UROLOGY | Facility: CLINIC | Age: 61
End: 2026-01-21
Payer: COMMERCIAL

## (undated) DEVICE — INSTINCT ENDOSCOPIC HEMOCLIP: Brand: INSTINCT